# Patient Record
Sex: FEMALE | Race: WHITE | NOT HISPANIC OR LATINO | URBAN - METROPOLITAN AREA
[De-identification: names, ages, dates, MRNs, and addresses within clinical notes are randomized per-mention and may not be internally consistent; named-entity substitution may affect disease eponyms.]

---

## 2020-01-23 ENCOUNTER — OFFICE VISIT (OUTPATIENT)
Dept: PAIN MEDICINE | Facility: CLINIC | Age: 60
End: 2020-01-23
Payer: COMMERCIAL

## 2020-01-23 ENCOUNTER — TELEPHONE (OUTPATIENT)
Dept: PAIN MEDICINE | Facility: CLINIC | Age: 60
End: 2020-01-23

## 2020-01-23 ENCOUNTER — HOSPITAL ENCOUNTER (OUTPATIENT)
Dept: RADIOLOGY | Facility: HOSPITAL | Age: 60
Discharge: HOME | End: 2020-01-23
Attending: PHYSICAL MEDICINE & REHABILITATION
Payer: COMMERCIAL

## 2020-01-23 VITALS
DIASTOLIC BLOOD PRESSURE: 89 MMHG | HEIGHT: 62 IN | HEART RATE: 99 BPM | BODY MASS INDEX: 36.07 KG/M2 | WEIGHT: 196 LBS | SYSTOLIC BLOOD PRESSURE: 144 MMHG

## 2020-01-23 DIAGNOSIS — M25.562 CHRONIC PAIN OF BOTH KNEES: ICD-10-CM

## 2020-01-23 DIAGNOSIS — M25.561 CHRONIC PAIN OF BOTH KNEES: ICD-10-CM

## 2020-01-23 DIAGNOSIS — M17.0 PRIMARY OSTEOARTHRITIS OF BOTH KNEES: ICD-10-CM

## 2020-01-23 DIAGNOSIS — M79.18 CHRONIC MYOFASCIAL PAIN: ICD-10-CM

## 2020-01-23 DIAGNOSIS — M47.26 OSTEOARTHRITIS OF SPINE WITH RADICULOPATHY, LUMBAR REGION: ICD-10-CM

## 2020-01-23 DIAGNOSIS — G89.29 CHRONIC MYOFASCIAL PAIN: ICD-10-CM

## 2020-01-23 DIAGNOSIS — G89.29 CHRONIC PAIN OF BOTH KNEES: ICD-10-CM

## 2020-01-23 DIAGNOSIS — M46.1 SACROILIITIS: ICD-10-CM

## 2020-01-23 DIAGNOSIS — M46.1 SACROILIITIS: Primary | ICD-10-CM

## 2020-01-23 DIAGNOSIS — E66.9 OBESITY (BMI 35.0-39.9 WITHOUT COMORBIDITY): ICD-10-CM

## 2020-01-23 PROCEDURE — 3008F PR BODY MASS INDEX (BMI) DOCUMENTED: ICD-10-PCS | Mod: CPTII,S$GLB,, | Performed by: PHYSICAL MEDICINE & REHABILITATION

## 2020-01-23 PROCEDURE — 99204 PR OFFICE/OUTPT VISIT, NEW, LEVL IV, 45-59 MIN: ICD-10-PCS | Mod: S$GLB,,, | Performed by: PHYSICAL MEDICINE & REHABILITATION

## 2020-01-23 PROCEDURE — 73562 X-RAY EXAM OF KNEE 3: CPT | Mod: 26,RT,, | Performed by: RADIOLOGY

## 2020-01-23 PROCEDURE — 72110 X-RAY EXAM L-2 SPINE 4/>VWS: CPT | Mod: 26,,, | Performed by: RADIOLOGY

## 2020-01-23 PROCEDURE — 73562 X-RAY EXAM OF KNEE 3: CPT | Mod: 26,LT,, | Performed by: RADIOLOGY

## 2020-01-23 PROCEDURE — 73562 PR  X-RAY KNEE 3 VIEW: ICD-10-PCS | Mod: 26,LT,, | Performed by: RADIOLOGY

## 2020-01-23 PROCEDURE — 99999 PR PBB SHADOW E&M-EST. PATIENT-LVL III: ICD-10-PCS | Mod: PBBFAC,,, | Performed by: PHYSICAL MEDICINE & REHABILITATION

## 2020-01-23 PROCEDURE — 72110 X-RAY EXAM L-2 SPINE 4/>VWS: CPT | Mod: TC

## 2020-01-23 PROCEDURE — 99204 OFFICE O/P NEW MOD 45 MIN: CPT | Mod: S$GLB,,, | Performed by: PHYSICAL MEDICINE & REHABILITATION

## 2020-01-23 PROCEDURE — 72110 XR LUMBAR SPINE 5 VIEW WITH FLEX AND EXT: ICD-10-PCS | Mod: 26,,, | Performed by: RADIOLOGY

## 2020-01-23 PROCEDURE — 73562 X-RAY EXAM OF KNEE 3: CPT | Mod: TC,50

## 2020-01-23 PROCEDURE — 72052 XR CERVICAL SPINE 5 VIEW WITH FLEX AND EXT: ICD-10-PCS | Mod: 26,,, | Performed by: RADIOLOGY

## 2020-01-23 PROCEDURE — 72052 X-RAY EXAM NECK SPINE 6/>VWS: CPT | Mod: 26,,, | Performed by: RADIOLOGY

## 2020-01-23 PROCEDURE — 3008F BODY MASS INDEX DOCD: CPT | Mod: CPTII,S$GLB,, | Performed by: PHYSICAL MEDICINE & REHABILITATION

## 2020-01-23 PROCEDURE — 72052 X-RAY EXAM NECK SPINE 6/>VWS: CPT | Mod: TC

## 2020-01-23 PROCEDURE — 99999 PR PBB SHADOW E&M-EST. PATIENT-LVL III: CPT | Mod: PBBFAC,,, | Performed by: PHYSICAL MEDICINE & REHABILITATION

## 2020-01-23 RX ORDER — TIZANIDINE 4 MG/1
4 TABLET ORAL NIGHTLY PRN
Qty: 90 TABLET | Refills: 2 | Status: SHIPPED | OUTPATIENT
Start: 2020-01-23 | End: 2020-10-19

## 2020-01-23 RX ORDER — HYDROCODONE BITARTRATE AND ACETAMINOPHEN 7.5; 325 MG/1; MG/1
1 TABLET ORAL EVERY 8 HOURS PRN
Qty: 21 TABLET | Refills: 0 | Status: SHIPPED | OUTPATIENT
Start: 2020-01-23 | End: 2020-01-30

## 2020-01-23 RX ORDER — GABAPENTIN 300 MG/1
CAPSULE ORAL
Qty: 90 CAPSULE | Refills: 2 | Status: SHIPPED | OUTPATIENT
Start: 2020-01-23 | End: 2020-04-21

## 2020-01-23 NOTE — PATIENT INSTRUCTIONS
- Start tizanidine 4mg nightly as needed x 7 days, then increase by 1/2 tab every 7 days to most tolerable and effective dosing. Max dosing being 12mg (3 full tabs) nightly.  - Increase gabapentin to 300mg in the AM and 600mg in the PM  - Provided Norco 7.5mg/325mg 3x daily as needed for severe pain flare  - Continue other current medications as prescribed.  - Continue home based exercises and discussed the importance of a healthy and active lifestyle  - Return for follow up in 8 weeks.  Discussed viscosupplementation injections to be the provided by myself for outside orthopedics.    Please do not hesitate to contact the clinic [phone] if you have any questions regarding your treatment plan.     Dirk Alexis MD  Interventional Pain Medicine  [facility] [place]

## 2020-01-23 NOTE — TELEPHONE ENCOUNTER
Pt will be late and I informed her that  has meeting 12 until 1 pm and she may have to wait until after 1 pm to be seen. Pt was ok with that

## 2020-01-23 NOTE — TELEPHONE ENCOUNTER
----- Message from Kristi Ayon sent at 1/23/2020 11:11 AM CST -----  Contact: pt   The patient is calling in regards to being 15-20 minutes late due to being lost and heading back over the [facility],please advise # [phone] (home)
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
## 2020-02-12 ENCOUNTER — PATIENT MESSAGE (OUTPATIENT)
Dept: PAIN MEDICINE | Facility: CLINIC | Age: 60
End: 2020-02-12

## 2020-02-12 ENCOUNTER — TELEPHONE (OUTPATIENT)
Dept: PAIN MEDICINE | Facility: CLINIC | Age: 60
End: 2020-02-12

## 2020-02-12 NOTE — TELEPHONE ENCOUNTER
Tried to call to reschedule appt for 03/20 with  to a day where he will be at the [facility] or see him in [place] on 03/20 . No answer. Left vm and left message on portal

## 2020-02-17 ENCOUNTER — TELEPHONE (OUTPATIENT)
Dept: PAIN MEDICINE | Facility: CLINIC | Age: 60
End: 2020-02-17

## 2020-02-17 NOTE — TELEPHONE ENCOUNTER
Pt asked to get copy of disc for x ray. Informed pt she has to go to medical records and request for disc . Informed pt that I can give her copy of report and get it from  at the [facility]. Pt understood. All questions answered.

## 2020-02-17 NOTE — TELEPHONE ENCOUNTER
----- Message from Angela Harris sent at 2/17/2020  9:53 AM CST -----  Contact: Pt  Pt is requesting call back in regards to getting copy of XRAY         Pls call back at [phone]

## 2020-03-18 ENCOUNTER — TELEPHONE (OUTPATIENT)
Dept: PAIN MEDICINE | Facility: CLINIC | Age: 60
End: 2020-03-18

## 2020-04-21 RX ORDER — GABAPENTIN 300 MG/1
CAPSULE ORAL
Qty: 90 CAPSULE | Refills: 2 | Status: SHIPPED | OUTPATIENT
Start: 2020-04-21 | End: 2020-07-14

## 2021-03-12 PROBLEM — F31.9 BIPOLAR DISORDER: Status: ACTIVE | Noted: 2018-12-12

## 2021-03-12 PROBLEM — N18.30 STAGE 3 CHRONIC KIDNEY DISEASE: Status: ACTIVE | Noted: 2018-12-12

## 2021-03-12 PROBLEM — N28.1 RENAL CYST: Status: ACTIVE | Noted: 2019-03-05

## 2021-06-24 PROBLEM — E78.2 MIXED HYPERLIPIDEMIA: Status: ACTIVE | Noted: 2021-05-07

## 2021-06-24 PROBLEM — E03.9 HYPOTHYROIDISM (ACQUIRED): Status: ACTIVE | Noted: 2021-05-07

## 2021-06-24 PROBLEM — K21.9 GERD (GASTROESOPHAGEAL REFLUX DISEASE): Status: ACTIVE | Noted: 2021-05-07

## 2021-06-24 PROBLEM — J45.909 MILD ASTHMA WITHOUT COMPLICATION: Status: ACTIVE | Noted: 2021-05-07

## 2021-06-28 ENCOUNTER — TELEPHONE (OUTPATIENT)
Dept: RHEUMATOLOGY | Facility: CLINIC | Age: 61
End: 2021-06-28

## 2021-12-10 PROBLEM — N18.30 STAGE 3 CHRONIC KIDNEY DISEASE: Status: RESOLVED | Noted: 2018-12-12 | Resolved: 2021-12-10

## 2023-04-06 ENCOUNTER — PATIENT MESSAGE (OUTPATIENT)
Dept: RESEARCH | Facility: HOSPITAL | Age: 63
End: 2023-04-06
Payer: COMMERCIAL

## 2023-05-31 ENCOUNTER — PATIENT MESSAGE (OUTPATIENT)
Dept: RESEARCH | Facility: HOSPITAL | Age: 63
End: 2023-05-31
Payer: COMMERCIAL

## 2023-06-14 PROBLEM — M06.09 RHEUMATOID ARTHRITIS OF MULTIPLE SITES WITH NEGATIVE RHEUMATOID FACTOR: Status: ACTIVE | Noted: 2023-06-14

## 2024-06-20 ENCOUNTER — OFFICE VISIT (OUTPATIENT)
Dept: RHEUMATOLOGY | Facility: CLINIC | Age: 64
End: 2024-06-20
Payer: COMMERCIAL

## 2024-06-20 ENCOUNTER — PATIENT MESSAGE (OUTPATIENT)
Dept: RHEUMATOLOGY | Facility: CLINIC | Age: 64
End: 2024-06-20

## 2024-06-20 ENCOUNTER — HOSPITAL ENCOUNTER (OUTPATIENT)
Dept: RADIOLOGY | Facility: HOSPITAL | Age: 64
Discharge: HOME | End: 2024-06-20
Attending: INTERNAL MEDICINE
Payer: COMMERCIAL

## 2024-06-20 VITALS
SYSTOLIC BLOOD PRESSURE: 131 MMHG | HEIGHT: 62 IN | HEART RATE: 94 BPM | WEIGHT: 151.25 LBS | DIASTOLIC BLOOD PRESSURE: 89 MMHG | BODY MASS INDEX: 27.83 KG/M2

## 2024-06-20 DIAGNOSIS — M25.40 PAINFUL SWELLING OF JOINT: Primary | ICD-10-CM

## 2024-06-20 DIAGNOSIS — F31.9 BIPOLAR AFFECTIVE DISORDER, REMISSION STATUS UNSPECIFIED: ICD-10-CM

## 2024-06-20 DIAGNOSIS — M54.50 CHRONIC LOW BACK PAIN, UNSPECIFIED BACK PAIN LATERALITY, UNSPECIFIED WHETHER SCIATICA PRESENT: ICD-10-CM

## 2024-06-20 DIAGNOSIS — G89.29 CHRONIC LOW BACK PAIN, UNSPECIFIED BACK PAIN LATERALITY, UNSPECIFIED WHETHER SCIATICA PRESENT: ICD-10-CM

## 2024-06-20 DIAGNOSIS — E55.9 VITAMIN D INSUFFICIENCY: ICD-10-CM

## 2024-06-20 DIAGNOSIS — M79.642 PAIN IN BOTH HANDS: ICD-10-CM

## 2024-06-20 DIAGNOSIS — M35.00 SICCA SYNDROME: ICD-10-CM

## 2024-06-20 DIAGNOSIS — R53.82 CHRONIC FATIGUE: ICD-10-CM

## 2024-06-20 DIAGNOSIS — M79.641 PAIN IN BOTH HANDS: ICD-10-CM

## 2024-06-20 DIAGNOSIS — M15.9 PRIMARY OSTEOARTHRITIS INVOLVING MULTIPLE JOINTS: ICD-10-CM

## 2024-06-20 DIAGNOSIS — Z98.890 S/P SPINAL SURGERY: ICD-10-CM

## 2024-06-20 PROBLEM — M06.09 RHEUMATOID ARTHRITIS OF MULTIPLE SITES WITH NEGATIVE RHEUMATOID FACTOR: Status: RESOLVED | Noted: 2023-06-14 | Resolved: 2024-06-20

## 2024-06-20 PROCEDURE — 73130 X-RAY EXAM OF HAND: CPT | Mod: 26,50,, | Performed by: RADIOLOGY

## 2024-06-20 PROCEDURE — 99999 PR PBB SHADOW E&M-EST. PATIENT-LVL V: CPT | Mod: PBBFAC,,, | Performed by: INTERNAL MEDICINE

## 2024-06-20 PROCEDURE — 73630 X-RAY EXAM OF FOOT: CPT | Mod: 26,50,, | Performed by: RADIOLOGY

## 2024-06-20 PROCEDURE — 73130 X-RAY EXAM OF HAND: CPT | Mod: TC,50

## 2024-06-20 PROCEDURE — 73630 X-RAY EXAM OF FOOT: CPT | Mod: TC,50

## 2024-06-20 RX ORDER — HYDROXYCHLOROQUINE SULFATE 200 MG/1
200 TABLET, FILM COATED ORAL 2 TIMES DAILY
Qty: 180 TABLET | Refills: 1 | Status: SHIPPED | OUTPATIENT
Start: 2024-06-20 | End: 2024-12-17

## 2024-06-20 NOTE — PROGRESS NOTES
RHEUMATOLOGY OUTPATIENT CLINIC NOTE    6/20/2024    Attending Rheumatologist: Selvin Kamara  Primary Care Provider/Physician Requesting Consultation: Jose Taylor FNP   Chief Complaint/Reason For Consultation:  Rheumatoid Arthritis      Subjective:     Kelly Gil[facility] is a 63 y.o. White female with chronic hand pain    Recurrent hand pain with mixed pain characteristics.  Describes also chronic LBP with mechanical pattern and no alarm s/s.    Review of Systems   Constitutional:  Negative for fever.   HENT:  Negative for nosebleeds.         Mild sicca sx.  Denies jaw claudication   Eyes:  Negative for photophobia and pain (no hx of uveitis or scleritis).   Cardiovascular:  Negative for chest pain.   Gastrointestinal:  Negative for blood in stool and melena.        No hx of IBD.  Hx of diverticulitis.   Genitourinary:  Negative for dysuria, hematuria and urgency.   Musculoskeletal:  Positive for back pain and joint pain.   Skin:  Negative for rash.        No hx of PsO.  Denies RP   Neurological:  Negative for focal weakness, weakness and headaches.   Endo/Heme/Allergies:         No hx of diverticulitis       Chronic comorbid conditions affecting medical decision making today:  Past Medical History:   Diagnosis Date    Allergy     Anxiety     Asthma     Bipolar disorder     Chronic low back pain     Depression     Deviated septum     Disorder of kidney and ureter     Diverticulitis     DJD (degenerative joint disease)     Encounter for blood transfusion     GERD (gastroesophageal reflux disease)     Hyperlipidemia     Hypothyroidism     Neck pain     Osteoarthritis      Past Surgical History:   Procedure Laterality Date    ABCESS DRAINAGE      CATARACT EXTRACTION      DILATION AND CURETTAGE OF UTERUS      EXTRACTION OF TOOTH      EYE SURGERY  01/2006    Cataract Surgery Both Eyes Not Done On Same Day    HIP SURGERY      HYSTERECTOMY      JOINT REPLACEMENT  12/22/2015 & 11/03/2016    Right Hip first then Left Hip     LUMBAR DISC SURGERY      NASAL SEPTOPLASTY      OPEN RESECTION OF LUNG      SINUS SURGERY      SPINE SURGERY  2012    Lower Back Surgery    turbinectomy       Family History   Problem Relation Name Age of Onset    Charcot-Bonny-Tooth disease Mother Olga Carter     Alzheimer's disease Mother Olga Carter     Arthritis Mother Olga Carter         Charcot-Bonny-Tooth,    Lung cancer Father Murali Carter     Pancreatic cancer Father Murali Carter     Liver cancer Father Murali Carter     Bone cancer Father Murali Carter     Cancer Father Murali Carter         Pancreatic Cancer, Liver Cancer, Bone Cancer, Brain Cancer, Passed Away    Cancer Sister Pushpa Chisholm         Endometrial Cancer, Lung Cancer, Passed away    Endometrial cancer Sister Pushpa Chisholm     Lung cancer Sister Pushpa Chisholm     Diabetes Sister Pushpa Chisholm     Heart disease Brother Ace Carter         Heart Disease, Organ Failure ... Has passed away     Social History     Tobacco Use   Smoking Status Former    Current packs/day: 0.00    Average packs/day: 0.3 packs/day for 1 year (0.3 ttl pk-yrs)    Types: Cigars, Cigarettes    Quit date: 2024    Years since quittin.1   Smokeless Tobacco Never   Tobacco Comments    Smoke 2 or 3 Black & Mild Cigars as day.       Current Outpatient Medications:     albuterol (PROVENTIL/VENTOLIN HFA) 90 mcg/actuation inhaler, Inhale 2 puffs into the lungs every 6 (six) hours as needed for Wheezing. Rescue, Disp: 8.5 g, Rfl: 2    albuterol-ipratropium (DUO-NEB) 2.5 mg-0.5 mg/3 mL nebulizer solution, Take 3 mLs by nebulization every 6 (six) hours as needed for Wheezing. Rescue, Disp: 30 each, Rfl: 0    ALPRAZolam (XANAX) 0.5 MG tablet, Take 1 tablet (0.5 mg total) by mouth 2 (two) times daily., Disp: 60 tablet, Rfl: 2    azelastine (ASTELIN) 137 mcg (0.1 %) nasal spray,  SPRAY 2 SPRAYS INTO EACH NOSTRIL TWICE DAILY., Disp: 30 mL, Rfl: 1    butalbital-acetaminophen-caffeine -40 mg (FIORICET, ESGIC) -40 mg per tablet, Take 1 tablet by mouth every 6 (six) hours as needed for Headaches., Disp: 30 tablet, Rfl: 2    cariprazine (VRAYLAR) 3 mg Cap, Take 1 capsule (3 mg total) by mouth Daily., Disp: 90 capsule, Rfl: 1    DULoxetine (CYMBALTA) 60 MG capsule, Take 1 capsule (60 mg total) by mouth once daily., Disp: 90 capsule, Rfl: 1    levothyroxine (SYNTHROID) 50 MCG tablet, Take 1 tablet (50 mcg total) by mouth once daily., Disp: 90 tablet, Rfl: 1    meloxicam (MOBIC) 15 MG tablet, Take 1 tablet (15 mg total) by mouth daily as needed for Pain., Disp: 90 tablet, Rfl: 1    montelukast (SINGULAIR) 10 mg tablet, Take 1 tablet (10 mg total) by mouth every evening., Disp: 90 tablet, Rfl: 1    omeprazole (PRILOSEC) 40 MG capsule, Take 1 capsule (40 mg total) by mouth once daily., Disp: 90 capsule, Rfl: 1    pravastatin (PRAVACHOL) 20 MG tablet, Take 1 tablet (20 mg total) by mouth once daily., Disp: 90 tablet, Rfl: 1    QUEtiapine (SEROQUEL XR) 400 MG Tb24, Take 1 tablet (400 mg total) by mouth once daily., Disp: 90 tablet, Rfl: 1    tiZANidine (ZANAFLEX) 4 MG tablet, Take 1-2 tablets (4-8 mg total) by mouth nightly as needed (back pain)., Disp: 30 tablet, Rfl: 2    cetirizine-pseudoephedrine (ZYRTEC-D) 5-120 mg Tb12, Take 1 tablet by mouth 2 (two) times a day. (Patient not taking: Reported on 6/20/2024), Disp: 30 tablet, Rfl: 1    hydrOXYzine HCL (ATARAX) 25 MG tablet, Take 1 tablet (25 mg total) by mouth 3 (three) times daily. (Patient not taking: Reported on 6/20/2024), Disp: 60 tablet, Rfl: 2     Objective:     Vitals:    06/20/24 0742   BP: 131/89   Pulse: 94     Physical Exam   Eyes: Conjunctivae are normal.   Pulmonary/Chest: Effort normal. No respiratory distress.   Musculoskeletal:         General: Swelling, tenderness and deformity present.   Neurological: She displays no  weakness.   Skin: No rash noted.       Reviewed available old and all outside pertinent medical records available.    All lab results personally reviewed and interpreted by me.       ASSESSMENT / PLAN     1. Painful swelling of joint  Rt thumb MP joint.  Features of early erosive OA.  Patient amenable for trial of HCQ.  Plan for MRI of worst joint to determine if active synovitis, if unrevealing tests or no response to HCQ.  C-Reactive Protein    Sedimentation rate    hydroxychloroquine (PLAQUENIL) 200 mg tablet      2. Pain in both hands  Negative RF on file.  Ambulatory referral/consult to Rheumatology    Cyclic Citrullinated Peptide Antibody, IgG    Rheumatoid Factor      3. Primary osteoarthritis involving multiple joints  Cyclic Citrullinated Peptide Antibody, IgG    Rheumatoid Factor    X-Ray Hand Complete Bilateral    X-Ray Foot AP Bilateral      4. Bipolar affective disorder, remission status unspecified        5. S/P spinal surgery  Follow with IPM if failure to PT      6. Chronic low back pain, unspecified back pain laterality, unspecified whether sciatica present  No ankylosis or marginal erosive changes reported on imaging.  HLA B27 Antigen      7. Chronic fatigue  CBC Auto Differential    Hepatitis B Surface Antigen    Hepatitis B Core Antibody, Total    Quantiferon Gold TB      8. Sicca syndrome  SPENCER Screen w/Reflex    CBC Auto Differential    Comprehensive Metabolic Panel    Ambulatory referral/consult to Ophthalmology      9. Vitamin D insufficiency  Vitamin D level                Selvin Kamara M.D.

## 2024-07-26 ENCOUNTER — OFFICE VISIT (OUTPATIENT)
Dept: OPHTHALMOLOGY | Facility: CLINIC | Age: 64
End: 2024-07-26
Payer: COMMERCIAL

## 2024-07-26 DIAGNOSIS — Z79.899 ENCOUNTER FOR EYE EXAM DUE TO HIGH RISK MEDICATION: Primary | ICD-10-CM

## 2024-07-26 PROCEDURE — 99999 PR PBB SHADOW E&M-EST. PATIENT-LVL III: CPT | Mod: PBBFAC,,, | Performed by: OPHTHALMOLOGY

## 2024-07-26 NOTE — PROGRESS NOTES
HPI     High-risk medication     Additional comments: Pt here for plaquenil baseline exam. No pain or   discomfort. Pt says she has been seeing floaters and flashes in both eyes   for about 2 months.            Comments    1. Osteoarthritis, plaquenil x 6/2024  2. Fhx AMD (sister)             Last edited by Sohail Howard MA on 7/26/2024 10:31 AM.            Assessment /Plan     For exam results, see Encounter Report.    Encounter for eye exam due to high risk medication  400mg a day  hydroxychloroquine (PLAQUENIL)  No evidence of maculopathy on DFE. HVF 10-2 and MOCT both WNL  Follow with annual testing       RTC 1 year sooner if needed

## 2024-08-14 DIAGNOSIS — M25.40 PAINFUL SWELLING OF JOINT: ICD-10-CM

## 2024-08-15 RX ORDER — HYDROXYCHLOROQUINE SULFATE 200 MG/1
200 TABLET, FILM COATED ORAL 2 TIMES DAILY
Qty: 180 TABLET | Refills: 1 | Status: SHIPPED | OUTPATIENT
Start: 2024-08-15

## 2024-09-21 NOTE — TELEPHONE ENCOUNTER
Was calling to confirm appt for 03/19/20 with  and pt asked would he prescribing pain medication? Informed pt that we are interventional pain and we focus on interventional measures and not pain medication . Pt requested I cancel and she stated she would find care elsewhere. All questions answered.    1% lidocaine 1% lidocaine 1% lidocaine

## 2024-09-25 ENCOUNTER — OFFICE VISIT (OUTPATIENT)
Dept: RHEUMATOLOGY | Facility: CLINIC | Age: 64
End: 2024-09-25
Payer: COMMERCIAL

## 2024-09-25 VITALS
DIASTOLIC BLOOD PRESSURE: 85 MMHG | BODY MASS INDEX: 30.18 KG/M2 | HEIGHT: 62 IN | SYSTOLIC BLOOD PRESSURE: 145 MMHG | WEIGHT: 164 LBS | HEART RATE: 85 BPM

## 2024-09-25 DIAGNOSIS — M25.40 PAINFUL SWELLING OF JOINT: ICD-10-CM

## 2024-09-25 DIAGNOSIS — M54.50 CHRONIC LOW BACK PAIN, UNSPECIFIED BACK PAIN LATERALITY, UNSPECIFIED WHETHER SCIATICA PRESENT: ICD-10-CM

## 2024-09-25 DIAGNOSIS — Z51.81 MEDICATION MONITORING ENCOUNTER: ICD-10-CM

## 2024-09-25 DIAGNOSIS — M15.4 EROSIVE OSTEOARTHRITIS: Primary | ICD-10-CM

## 2024-09-25 DIAGNOSIS — M17.0 PRIMARY OSTEOARTHRITIS OF BOTH KNEES: ICD-10-CM

## 2024-09-25 DIAGNOSIS — G89.29 CHRONIC LOW BACK PAIN, UNSPECIFIED BACK PAIN LATERALITY, UNSPECIFIED WHETHER SCIATICA PRESENT: ICD-10-CM

## 2024-09-25 PROCEDURE — 20610 DRAIN/INJ JOINT/BURSA W/O US: CPT | Mod: 50,S$GLB,, | Performed by: INTERNAL MEDICINE

## 2024-09-25 PROCEDURE — 99999 PR PBB SHADOW E&M-EST. PATIENT-LVL III: CPT | Mod: PBBFAC,,, | Performed by: INTERNAL MEDICINE

## 2024-09-25 PROCEDURE — 3008F BODY MASS INDEX DOCD: CPT | Mod: CPTII,S$GLB,, | Performed by: INTERNAL MEDICINE

## 2024-09-25 PROCEDURE — 99214 OFFICE O/P EST MOD 30 MIN: CPT | Mod: 25,S$GLB,, | Performed by: INTERNAL MEDICINE

## 2024-09-25 PROCEDURE — 3079F DIAST BP 80-89 MM HG: CPT | Mod: CPTII,S$GLB,, | Performed by: INTERNAL MEDICINE

## 2024-09-25 PROCEDURE — 1159F MED LIST DOCD IN RCRD: CPT | Mod: CPTII,S$GLB,, | Performed by: INTERNAL MEDICINE

## 2024-09-25 PROCEDURE — 3077F SYST BP >= 140 MM HG: CPT | Mod: CPTII,S$GLB,, | Performed by: INTERNAL MEDICINE

## 2024-09-25 PROCEDURE — 3044F HG A1C LEVEL LT 7.0%: CPT | Mod: CPTII,S$GLB,, | Performed by: INTERNAL MEDICINE

## 2024-09-25 RX ORDER — HYDROXYCHLOROQUINE SULFATE 200 MG/1
200 TABLET, FILM COATED ORAL 2 TIMES DAILY
Qty: 180 TABLET | Refills: 1 | Status: SHIPPED | OUTPATIENT
Start: 2024-09-25

## 2024-09-25 RX ORDER — TRIAMCINOLONE ACETONIDE 40 MG/ML
40 INJECTION, SUSPENSION INTRA-ARTICULAR; INTRAMUSCULAR
Status: DISCONTINUED | OUTPATIENT
Start: 2024-09-25 | End: 2024-09-25 | Stop reason: HOSPADM

## 2024-09-25 RX ADMIN — TRIAMCINOLONE ACETONIDE 40 MG: 40 INJECTION, SUSPENSION INTRA-ARTICULAR; INTRAMUSCULAR at 11:09

## 2024-09-25 NOTE — PROGRESS NOTES
RHEUMATOLOGY OUTPATIENT CLINIC NOTE    9/25/2024    Attending Rheumatologist: Selvin Kamara  Primary Care Provider/Physician Requesting Consultation: Jose Taylor FNP   Chief Complaint/Reason For Consultation:  No chief complaint on file.      Subjective:     Kelly Gilmore is a 64 y.o. White female with erosive OA    Adequate response to HCQ, reports less hand arthralgias.  Unchanged chronic LE and back pain discomfort, sub-adequate response to aquatic therapy.    Review of Systems   Constitutional:  Negative for fever.   HENT:  Negative for nosebleeds.    Eyes:  Negative for photophobia and pain.   Respiratory:  Negative for cough.    Cardiovascular:  Negative for chest pain and leg swelling.   Gastrointestinal:  Negative for blood in stool and melena.   Genitourinary:  Negative for dysuria, hematuria and urgency.   Musculoskeletal:  Positive for back pain and joint pain. Negative for falls.   Skin:  Negative for rash.   Neurological:  Negative for weakness.     Chronic comorbid conditions affecting medical decision making today:  Past Medical History:   Diagnosis Date    Allergy     Anxiety     Asthma     Bipolar disorder     Chronic low back pain     Depression     Deviated septum     Disorder of kidney and ureter     Diverticulitis     DJD (degenerative joint disease)     Encounter for blood transfusion     GERD (gastroesophageal reflux disease)     Hyperlipidemia     Hypothyroidism     Neck pain     Osteoarthritis      Past Surgical History:   Procedure Laterality Date    ABCESS DRAINAGE      CATARACT EXTRACTION      DILATION AND CURETTAGE OF UTERUS      EXTRACTION OF TOOTH      EYE SURGERY  01/2006    Cataract Surgery Both Eyes Not Done On Same Day    HIP SURGERY      HYSTERECTOMY      JOINT REPLACEMENT  12/22/2015 & 11/03/2016    Right Hip first then Left Hip    LUMBAR DISC SURGERY      NASAL SEPTOPLASTY      OPEN RESECTION OF LUNG      SINUS SURGERY      SPINE SURGERY  08/2012    Lower Back Surgery     turbinectomy       Family History   Problem Relation Name Age of Onset    Charcot-Bonny-Tooth disease Mother Olga Carter     Alzheimer's disease Mother Olga Carter     Arthritis Mother Olga Carter         Charcot-Bonny-Tooth,    Lung cancer Father Murali Carter     Pancreatic cancer Father Murali Carter     Liver cancer Father Murali Carter     Bone cancer Father Murali Carter     Cancer Father Murali Cartre         Pancreatic Cancer, Liver Cancer, Bone Cancer, Brain Cancer, Passed Away    Cancer Sister Pushpa Chisholm         Endometrial Cancer, Lung Cancer, Passed away    Endometrial cancer Sister Pushpa Chisholm     Lung cancer Sister Pushpa Chisholm     Diabetes Sister Pushpa Chisholm     Heart disease Brother Ace Carter         Heart Disease, Organ Failure ... Has passed away     Social History     Tobacco Use   Smoking Status Former    Current packs/day: 0.00    Average packs/day: 0.3 packs/day for 1 year (0.3 ttl pk-yrs)    Types: Cigars, Cigarettes    Quit date: 2024    Years since quittin.4   Smokeless Tobacco Never   Tobacco Comments    Smoke 2 or 3 Black & Mild Cigars as day.       Current Outpatient Medications:     albuterol (PROVENTIL/VENTOLIN HFA) 90 mcg/actuation inhaler, Inhale 2 puffs into the lungs every 6 (six) hours as needed for Wheezing. Rescue, Disp: 8.5 g, Rfl: 2    albuterol-ipratropium (DUO-NEB) 2.5 mg-0.5 mg/3 mL nebulizer solution, Take 3 mLs by nebulization every 6 (six) hours as needed for Wheezing. Rescue, Disp: 30 each, Rfl: 0    ALPRAZolam (XANAX) 0.5 MG tablet, Take 1 tablet (0.5 mg total) by mouth 2 (two) times daily., Disp: 60 tablet, Rfl: 2    azelastine (ASTELIN) 137 mcg (0.1 %) nasal spray, SPRAY 2 SPRAYS INTO EACH NOSTRIL TWICE DAILY., Disp: 30 mL, Rfl: 1    butalbital-acetaminophen-caffeine -40 mg (FIORICET, ESGIC)  -40 mg per tablet, Take 1 tablet by mouth every 6 (six) hours as needed for Headaches., Disp: 30 tablet, Rfl: 2    cariprazine (VRAYLAR) 3 mg Cap, Take 1 capsule (3 mg total) by mouth Daily., Disp: 90 capsule, Rfl: 1    cetirizine-pseudoephedrine (ZYRTEC-D) 5-120 mg Tb12, Take 1 tablet by mouth 2 (two) times a day., Disp: 30 tablet, Rfl: 1    DULoxetine (CYMBALTA) 60 MG capsule, TAKE 1 CAPSULE BY MOUTH EVERY DAY, Disp: 90 capsule, Rfl: 1    hydroxychloroquine (PLAQUENIL) 200 mg tablet, TAKE 1 TABLET BY MOUTH TWICE A DAY, Disp: 180 tablet, Rfl: 1    hydrOXYzine HCL (ATARAX) 25 MG tablet, Take 1 tablet (25 mg total) by mouth 3 (three) times daily., Disp: 60 tablet, Rfl: 2    levothyroxine (SYNTHROID) 50 MCG tablet, Take 1 tablet (50 mcg total) by mouth once daily., Disp: 90 tablet, Rfl: 1    meloxicam (MOBIC) 15 MG tablet, TAKE 1 TABLET BY MOUTH EVERY DAY AS NEEDED FOR PAIN, Disp: 90 tablet, Rfl: 1    montelukast (SINGULAIR) 10 mg tablet, TAKE 1 TABLET BY MOUTH EVERY EVENING, Disp: 90 tablet, Rfl: 1    omeprazole (PRILOSEC) 40 MG capsule, Take 1 capsule (40 mg total) by mouth once daily., Disp: 90 capsule, Rfl: 1    pravastatin (PRAVACHOL) 20 MG tablet, Take 1 tablet (20 mg total) by mouth once daily., Disp: 90 tablet, Rfl: 1    QUEtiapine (SEROQUEL XR) 400 MG Tb24, TAKE 1 TABLET BY MOUTH EVERY DAY, Disp: 90 tablet, Rfl: 1    tiZANidine (ZANAFLEX) 4 MG tablet, TAKE 1 OR 2 TABLETS BY MOUTH AT BEDTIME AS NEEDED FOR BACK PAIN, Disp: 60 tablet, Rfl: 0     Objective:     Vitals:    09/25/24 1040   BP: (!) 145/85   Pulse: 85     Physical Exam   Eyes: Conjunctivae are normal.   Pulmonary/Chest: Effort normal. No respiratory distress.   Musculoskeletal:         General: Deformity present. No swelling or tenderness. Normal range of motion.   Skin: No rash noted.       Reviewed available old and all outside pertinent medical records available.    All lab results personally reviewed and interpreted by me.       ASSESSMENT  / PLAN     1. Erosive osteoarthritis  Negative RA serologies.  APR WNR.  No chronic inflammatory arthropathy reported on XR.  Adequate response to HCQ, continue unchanged.  C-Reactive Protein    Sedimentation rate    hydroxychloroquine (PLAQUENIL) 200 mg tablet      2. Medication monitoring encounter  At least once per year eye clinic check ups while on Plaquenil  Comprehensive Metabolic Panel      3. Painful swelling of joint  Uric Acid      4. Primary osteoarthritis of both knees  Orthopedics.  Large Joint Aspiration/Injection: R knee    Large Joint Aspiration/Injection: L knee      5. Chronic low back pain, unspecified back pain laterality, unspecified whether sciatica present  PT and IPM PRN              Large Joint Aspiration/Injection: R knee    Date/Time: 9/25/2024 11:00 AM    Performed by: Selvin Kamara MD  Authorized by: Selvin Kamara MD    Consent Done?:  Yes (Verbal)  Indications:  Pain  Site marked: the procedure site was marked    Timeout: prior to procedure the correct patient, procedure, and site was verified    Prep: patient was prepped and draped in usual sterile fashion    Local anesthetic:  Lidocaine 2% without epinephrine    Details:  Needle Size:  25 G  Ultrasonic Guidance for needle placement?: No    Location:  Knee  Site:  R knee  Medications:  40 mg triamcinolone acetonide 40 mg/mL  Patient tolerance:  Patient tolerated the procedure well with no immediate complications      Large Joint Aspiration/Injection: L knee    Date/Time: 9/25/2024 11:00 AM    Performed by: Selvin Kamara MD  Authorized by: Selvin Kamara MD    Consent Done?:  Yes (Verbal)  Indications:  Pain  Site marked: the procedure site was marked    Timeout: prior to procedure the correct patient, procedure, and site was verified    Prep: patient was prepped and draped in usual sterile fashion    Local anesthetic:  Lidocaine 2% without epinephrine    Details:  Needle Size:  25 G  Ultrasonic Guidance for needle placement?:  No    Approach:  Anterior  Location:  Knee  Site:  L knee  Medications:  40 mg triamcinolone acetonide 40 mg/mL  Patient tolerance:  Patient tolerated the procedure well with no immediate complications      Selvin Kamara M.D.

## 2024-09-25 NOTE — PROCEDURES
Large Joint Aspiration/Injection: L knee    Date/Time: 9/25/2024 11:00 AM    Performed by: Selvin Kamara MD  Authorized by: Selvin Kamara MD    Consent Done?:  Yes (Verbal)  Indications:  Pain  Site marked: the procedure site was marked    Timeout: prior to procedure the correct patient, procedure, and site was verified    Prep: patient was prepped and draped in usual sterile fashion    Local anesthetic:  Lidocaine 2% without epinephrine    Details:  Needle Size:  25 G  Ultrasonic Guidance for needle placement?: No    Approach:  Anterior  Location:  Knee  Site:  L knee  Medications:  40 mg triamcinolone acetonide 40 mg/mL  Patient tolerance:  Patient tolerated the procedure well with no immediate complications

## 2024-09-25 NOTE — PROCEDURES
Large Joint Aspiration/Injection: R knee    Date/Time: 9/25/2024 11:00 AM    Performed by: Selvin Kamara MD  Authorized by: Selvin Kamara MD    Consent Done?:  Yes (Verbal)  Indications:  Pain  Site marked: the procedure site was marked    Timeout: prior to procedure the correct patient, procedure, and site was verified    Prep: patient was prepped and draped in usual sterile fashion    Local anesthetic:  Lidocaine 2% without epinephrine    Details:  Needle Size:  25 G  Ultrasonic Guidance for needle placement?: No    Location:  Knee  Site:  R knee  Medications:  40 mg triamcinolone acetonide 40 mg/mL  Patient tolerance:  Patient tolerated the procedure well with no immediate complications

## 2024-10-23 ENCOUNTER — TELEPHONE (OUTPATIENT)
Dept: NEUROSURGERY | Facility: CLINIC | Age: 64
End: 2024-10-23
Payer: COMMERCIAL

## 2024-11-19 ENCOUNTER — OFFICE VISIT (OUTPATIENT)
Dept: NEUROSURGERY | Facility: CLINIC | Age: 64
End: 2024-11-19
Payer: COMMERCIAL

## 2024-11-19 VITALS
DIASTOLIC BLOOD PRESSURE: 89 MMHG | BODY MASS INDEX: 28.85 KG/M2 | WEIGHT: 157.75 LBS | SYSTOLIC BLOOD PRESSURE: 153 MMHG | HEART RATE: 76 BPM

## 2024-11-19 DIAGNOSIS — M54.41 CHRONIC BILATERAL LOW BACK PAIN WITH BILATERAL SCIATICA: ICD-10-CM

## 2024-11-19 DIAGNOSIS — M54.42 CHRONIC BILATERAL LOW BACK PAIN WITH BILATERAL SCIATICA: ICD-10-CM

## 2024-11-19 DIAGNOSIS — G89.29 CHRONIC BILATERAL LOW BACK PAIN WITH BILATERAL SCIATICA: ICD-10-CM

## 2024-11-19 DIAGNOSIS — Z98.1 HISTORY OF LUMBAR FUSION: Primary | ICD-10-CM

## 2024-11-19 DIAGNOSIS — M54.9 DORSALGIA, UNSPECIFIED: ICD-10-CM

## 2024-11-19 PROCEDURE — 3079F DIAST BP 80-89 MM HG: CPT | Mod: CPTII,S$GLB,, | Performed by: PHYSICIAN ASSISTANT

## 2024-11-19 PROCEDURE — 99999 PR PBB SHADOW E&M-EST. PATIENT-LVL IV: CPT | Mod: PBBFAC,,, | Performed by: PHYSICIAN ASSISTANT

## 2024-11-19 PROCEDURE — 1159F MED LIST DOCD IN RCRD: CPT | Mod: CPTII,S$GLB,, | Performed by: PHYSICIAN ASSISTANT

## 2024-11-19 PROCEDURE — 3044F HG A1C LEVEL LT 7.0%: CPT | Mod: CPTII,S$GLB,, | Performed by: PHYSICIAN ASSISTANT

## 2024-11-19 PROCEDURE — 3008F BODY MASS INDEX DOCD: CPT | Mod: CPTII,S$GLB,, | Performed by: PHYSICIAN ASSISTANT

## 2024-11-19 PROCEDURE — 99203 OFFICE O/P NEW LOW 30 MIN: CPT | Mod: S$GLB,,, | Performed by: PHYSICIAN ASSISTANT

## 2024-11-19 PROCEDURE — 3077F SYST BP >= 140 MM HG: CPT | Mod: CPTII,S$GLB,, | Performed by: PHYSICIAN ASSISTANT

## 2024-11-19 RX ORDER — TRAMADOL HYDROCHLORIDE 50 MG/1
50 TABLET ORAL EVERY 4 HOURS PRN
Qty: 28 TABLET | Refills: 0 | Status: SHIPPED | OUTPATIENT
Start: 2024-11-19

## 2024-11-19 RX ORDER — TIZANIDINE 4 MG/1
4 TABLET ORAL EVERY 6 HOURS PRN
Qty: 30 TABLET | Refills: 2 | Status: SHIPPED | OUTPATIENT
Start: 2024-11-19 | End: 2024-12-19

## 2024-11-19 RX ORDER — PREGABALIN 50 MG/1
50 CAPSULE ORAL 3 TIMES DAILY
Qty: 90 CAPSULE | Refills: 6 | Status: SHIPPED | OUTPATIENT
Start: 2024-11-19 | End: 2025-05-20

## 2024-11-19 RX ORDER — METHYLPREDNISOLONE 4 MG/1
TABLET ORAL
Qty: 21 EACH | Refills: 0 | Status: SHIPPED | OUTPATIENT
Start: 2024-11-19 | End: 2024-12-10

## 2024-11-19 NOTE — PROGRESS NOTES
"Patient is 65 yo F who presents to clinic for evaluation of low back pain. She had previous Lumbar spinal fusion for scoliosis. States this was about 15 years ago with a Surgeon in [place]. Patient reports pain in low back which shoots down her buttocks and into BLLE. States she has some "catching" has had multiple falls. She has been ambulating with a cane. Patient initially improved after surgery, and slowly over the last few months has deteriorated. She reports pain shooting down intter thigh.        concerned that the massager dislodged her screws.     Patient presented to pain management physician about 4 years ago.     Has had previous JAYLIN      Pt. Takes fioricet for HA.     Balance problems due to burning and tearing pain in her muscles.     Had previous BL hip surgery and previous lung resection (after inhaling a corn nut)             Pertinent positive and negative ROS documented above in HPI, all other systems reviewed and found to be negative.         Constitutional/ General: Awake, alert, oriented X 3. Sitting upright in No acute distress. Well developed/well nourished    Neck: trachea midline. ROM INTACT    Respiratory: No increased work of breathing on room air. Chest expansion equal and symmetric.    Neuro: AAO X3 speech is fluent and appripriate CN II-XII grossly intact throughout. EOMI. Face symmetric tongue midline. Shoulder shrug equal and intact BL. Follows commands and moves all extremities antigravity.    Extremities:No cyanosis clubbing or edema. Ambulating with RW    A/P:    Kelly is a 64-year-old female who presents to neurosurgery clinic today for evaluation of acute flare-up of chronic low back pain with radiculopathy.  Patient has had previous spinal surgery years ago involving instrumentation with an outside surgeon.  She continues to have back pain leg pain falls as well as requiring a cane to get around.  I would like to obtain MRI of the lumbar spine as well as x-rays to " evaluate for compromise of neural elements and current status of instrumentation.  Tramadol and muscle relaxer p.r.n. we will prescribe Lyrica as well as a steroid pack.      I advised on signs and symptoms that prompt urgent medical attention the patient expressed understanding and agreement with plan of care she will follow-up after imaging studies are complete.      Attestation:  ISteph PA-C have obtained HPI, performed Physical Examination on the above patient, reviewed the pertinent labs, tests, imaging, other relevant data and recorded my findings in this clinic note.      This note was produced using dictation software of voice recognition technology, and some typographical errors may be present.

## 2024-12-17 ENCOUNTER — HOSPITAL ENCOUNTER (OUTPATIENT)
Dept: RADIOLOGY | Facility: HOSPITAL | Age: 64
Discharge: HOME | End: 2024-12-17
Attending: PHYSICIAN ASSISTANT
Payer: COMMERCIAL

## 2024-12-17 ENCOUNTER — OFFICE VISIT (OUTPATIENT)
Dept: NEUROSURGERY | Facility: CLINIC | Age: 64
End: 2024-12-17
Payer: COMMERCIAL

## 2024-12-17 VITALS
DIASTOLIC BLOOD PRESSURE: 81 MMHG | SYSTOLIC BLOOD PRESSURE: 133 MMHG | WEIGHT: 160.94 LBS | BODY MASS INDEX: 29.44 KG/M2 | HEART RATE: 98 BPM

## 2024-12-17 DIAGNOSIS — M54.41 CHRONIC BILATERAL LOW BACK PAIN WITH BILATERAL SCIATICA: Primary | ICD-10-CM

## 2024-12-17 DIAGNOSIS — M54.9 DORSALGIA, UNSPECIFIED: ICD-10-CM

## 2024-12-17 DIAGNOSIS — M54.42 CHRONIC BILATERAL LOW BACK PAIN WITH BILATERAL SCIATICA: Primary | ICD-10-CM

## 2024-12-17 DIAGNOSIS — G89.29 CHRONIC BILATERAL LOW BACK PAIN WITH BILATERAL SCIATICA: Primary | ICD-10-CM

## 2024-12-17 PROCEDURE — 99215 OFFICE O/P EST HI 40 MIN: CPT | Mod: S$GLB,,, | Performed by: PHYSICIAN ASSISTANT

## 2024-12-17 PROCEDURE — 1159F MED LIST DOCD IN RCRD: CPT | Mod: CPTII,S$GLB,, | Performed by: PHYSICIAN ASSISTANT

## 2024-12-17 PROCEDURE — 3075F SYST BP GE 130 - 139MM HG: CPT | Mod: CPTII,S$GLB,, | Performed by: PHYSICIAN ASSISTANT

## 2024-12-17 PROCEDURE — 99999 PR PBB SHADOW E&M-EST. PATIENT-LVL IV: CPT | Mod: PBBFAC,,, | Performed by: PHYSICIAN ASSISTANT

## 2024-12-17 PROCEDURE — 25500020 PHARM REV CODE 255: Performed by: PHYSICIAN ASSISTANT

## 2024-12-17 PROCEDURE — 72158 MRI LUMBAR SPINE W/O & W/DYE: CPT | Mod: 26,,, | Performed by: STUDENT IN AN ORGANIZED HEALTH CARE EDUCATION/TRAINING PROGRAM

## 2024-12-17 PROCEDURE — 72158 MRI LUMBAR SPINE W/O & W/DYE: CPT | Mod: TC

## 2024-12-17 PROCEDURE — 3079F DIAST BP 80-89 MM HG: CPT | Mod: CPTII,S$GLB,, | Performed by: PHYSICIAN ASSISTANT

## 2024-12-17 PROCEDURE — A9585 GADOBUTROL INJECTION: HCPCS | Performed by: PHYSICIAN ASSISTANT

## 2024-12-17 PROCEDURE — 3044F HG A1C LEVEL LT 7.0%: CPT | Mod: CPTII,S$GLB,, | Performed by: PHYSICIAN ASSISTANT

## 2024-12-17 PROCEDURE — 3008F BODY MASS INDEX DOCD: CPT | Mod: CPTII,S$GLB,, | Performed by: PHYSICIAN ASSISTANT

## 2024-12-17 RX ORDER — GADOBUTROL 604.72 MG/ML
10 INJECTION INTRAVENOUS
Status: COMPLETED | OUTPATIENT
Start: 2024-12-17 | End: 2024-12-17

## 2024-12-17 RX ORDER — TRAMADOL HYDROCHLORIDE 50 MG/1
50 TABLET ORAL EVERY 4 HOURS PRN
Qty: 30 TABLET | Refills: 0 | Status: SHIPPED | OUTPATIENT
Start: 2024-12-17

## 2024-12-17 RX ADMIN — GADOBUTROL 6 ML: 604.72 INJECTION INTRAVENOUS at 10:12

## 2024-12-17 NOTE — PROGRESS NOTES
"BL Leg pain. Legs give out on her. Pain.       Patient trying to lose weight to help with her pain. She continues to walk using RW. Having significant pain and overall debility, unable to perform cleaning etc...       Patient weight fluctuates.         PREVIOUS HPI:      Patient is 65 yo F who presents to clinic for evaluation of low back pain. She had previous Lumbar spinal fusion for scoliosis. States this was about 15 years ago with a Surgeon in [place]. Patient reports pain in low back which shoots down her buttocks and into BLLE. States she has some "catching" has had multiple falls. She has been ambulating with a cane. Patient initially improved after surgery, and slowly over the last few months has deteriorated. She reports pain shooting down intter thigh.        concerned that the massager dislodged her screws.     Patient presented to pain management physician about 4 years ago.     Has had previous JAYLIN      Pt. Takes fioricet for HA.     Balance problems due to burning and tearing pain in her muscles.     Had previous BL hip surgery and previous lung resection (after inhaling a corn nut)             Pertinent positive and negative ROS documented above in HPI, all other systems reviewed and found to be negative.         Constitutional/ General: Awake, alert, oriented X 3. Sitting upright in No acute distress. Well developed/well nourished    Neck: trachea midline. ROM INTACT    Respiratory: No increased work of breathing on room air. Chest expansion equal and symmetric.    Neuro: AAO X3 speech is fluent and appripriate CN II-XII grossly intact throughout. EOMI. Face symmetric tongue midline. Shoulder shrug equal and intact BL. Follows commands and moves all extremities antigravity.    Extremities:No cyanosis clubbing or edema. Ambulating with RW          mpression:     1.    L3-4 disc extrusion as above results in severe spinal canal stenosis and moderate to severe bilateral neuroforaminal " narrowing.  2.     L2-3 disc protrusion/extrusion results in moderate to severe spinal canal stenosis.  Associated bunching of the cauda equina nerve roots at the level of L2.  3.    Changes of L4-5 anterior and posterior fusion.  4.    Multilevel varying degrees of bilateral neuroforaminal narrowing as described in the level by level details above.  5.    No abnormal postcontrast enhancement at the surgical site.     Finalized on: 12/17/2024 11:34 AM By:  Jennyfer Pedersen MD  [facility]# [id number]      2024-12-17 11:36:[id number]    [facility]        Exam Ended: 12/17/24 10:21 CST Last Resulted: 12/17/24 11:34 CST       Order Details        View Encounter        Lab and Collection Details        Routing        Result History     View All Conversations on this Encounter     Result Care Coordination      Patient Communication     Add Comments   Add Notifications  Back to Top       MRI Lumbar Spine W WO Contrast: Patient Communication     Add Comments   Add Notifications      External Result Report    External Result Report     Narrative & Impression    PROCEDURE:  MRI LUMBAR SPINE W WO CONTRAST     INDICATION:  Pain     TECHNIQUE: MRI LUMBAR SPINE W WO CONTRAST. Contrast agent: Gadavist.  COMPARISON: None available.     FINDINGS:  Alignment is unremarkable.  Changes of L4-5 posterior and anterior fusion.  No evidence of aggressive osseous lesion or acute compression deformity.  Vertebral body heights are maintained.  Multilevel disc height loss, most pronounced at L3-4 where it is moderate to severe.  With exception of mild postcontrast enhancement about the below described disc protrusions/extrusions, there is no additional evidence of abnormal focal enhancement. Conus medullaris tip is at L1-2.  Bunching of the cauda equina nerve roots at the level of L2 due to below described spinal canal stenosis.     T12-L1: No significant spinal canal stenosis or neuroforaminal narrowing.     L1-2: No significant spinal canal stenosis or neuroforaminal  narrowing.     L2-3: Small to moderate concentric disc bulge superimposed left paracentral disc protrusion/extrusion.  Moderate spinal canal stenosis.  Mild left neuroforaminal narrowing.     L3-4: Moderate concentric disc bulge with superimposed left paracentral disc extrusion with cephalad migration measuring 2.0 cm in CC dimension.  Severe spinal canal stenosis measuring 6 mm in AP dimension.  Moderate to severe bilateral neural foraminal narrowing.  Moderate bilateral facet arthropathy and ligamentum flavum thickening.      L4-5: Instrumented level.  Small concentric disc bulge flattens the ventral thecal sac.  No significant spinal canal stenosis.  Mild bilateral neuroforaminal narrowing.  Moderate bilateral facet arthropathy and ligamentum flavum thickening.     L5-S1: Trace concentric disc bulge with no significant spinal canal stenosis.  Mild bilateral neuroforaminal narrowing.  Moderate to severe bilateral facet arthropathy and ligamentum flavum thickening..     Paravertebral soft tissues are unremarkable.        Impression:     1.    L3-4 disc extrusion as above results in severe spinal canal stenosis and moderate to severe bilateral neuroforaminal narrowing.  2.     L2-3 disc protrusion/extrusion results in moderate to severe spinal canal stenosis.  Associated bunching of the cauda equina nerve roots at the level of L2.  3.    Changes of L4-5 anterior and posterior fusion.  4.    Multilevel varying degrees of bilateral neuroforaminal narrowing as described in the level by level details above.  5.    No abnormal postcontrast enhancement at the surgical site.     Finalized on: 12/17/2024 11:34 AM By:  Jennyfer Pedersen MD  [facility]# [id number]      2024-12-17 11:36:[id number]    [facility]    Encounter    View Encounter             A/P:    Kelly is a 64-year-old female who presents to neurosurgery clinic today for evaluation of acute flare-up of chronic low back pain with radiculopathy.  Patient has had previous spinal surgery years  ago involving instrumentation with an outside surgeon.  She continues to have back pain leg pain falls as well as requiring a cane to get around.  I would like to obtain MRI of the lumbar spine as well as x-rays to evaluate for compromise of neural elements and current status of instrumentation.  Tramadol and muscle relaxer p.r.n. we will prescribe Lyrica           I reviewed MRI lumbar spine which shows previous L4-L5 instrumentation pedicle screws as well as a lif cage.  MRIs shows central spinal stenosis at L2-3 and L3-4 due to disc protrusion. There is in indication warranting acute neurosurgical intervention at this time. there is adjacent segment stenosis above the level of prior construct at L2-3 and L3-4 with narrowing of the foramen bilaterally.  I recommend bilateral transforaminal epidural steroid injections at this level with pain management additionally I would like the patient to see pain management and consultation to advise on medical management with medications as well as their input for treatment options I advised patient to continue Lyrica for neuropathic pain medication I refill muscle relaxer and tramadol pain procedure order placed she will follow up after injections      Explained that we should proceed with conservative measure and then address if surgery indicated at that time.      Patient did not undergo x-rays that were ordered last visit, states she will obtain these x-rays after the holidays.          I advised on signs and symptoms that prompt urgent medical attention the patient expressed understanding and agreement with plan of care she will follow-up after imaging studies are complete.      Attestation:  Steph JADE PA-C have obtained HPI, performed Physical Examination on the above patient, reviewed the pertinent labs, tests, imaging, other relevant data and recorded my findings in this clinic note.      This note was produced using dictation software of voice recognition  technology, and some typographical errors may be present.

## 2024-12-19 DIAGNOSIS — M54.16 LUMBAR RADICULOPATHY: Primary | ICD-10-CM

## 2025-01-02 ENCOUNTER — HOSPITAL ENCOUNTER (OUTPATIENT)
Dept: RADIOLOGY | Facility: HOSPITAL | Age: 65
Discharge: HOME | End: 2025-01-02
Attending: PHYSICIAN ASSISTANT
Payer: COMMERCIAL

## 2025-01-02 DIAGNOSIS — Z98.1 HISTORY OF LUMBAR FUSION: ICD-10-CM

## 2025-01-02 PROCEDURE — 72100 X-RAY EXAM L-S SPINE 2/3 VWS: CPT | Mod: 26,,, | Performed by: RADIOLOGY

## 2025-01-02 PROCEDURE — 72100 X-RAY EXAM L-S SPINE 2/3 VWS: CPT | Mod: TC

## 2025-01-03 ENCOUNTER — PATIENT MESSAGE (OUTPATIENT)
Dept: PAIN MEDICINE | Facility: CLINIC | Age: 65
End: 2025-01-03
Payer: COMMERCIAL

## 2025-01-15 RX ORDER — NAPROXEN SODIUM 220 MG/1
81 TABLET, FILM COATED ORAL DAILY
COMMUNITY

## 2025-01-28 NOTE — PRE-PROCEDURE INSTRUCTIONS
Spoke with patient regarding procedure scheduled on 1.31     Arrival time 1000     Has patient been sick with fever or on antibiotics within the last 7 days? No     Does the patient have any open wounds, sores or rashes? No     Does the patient have any recent fractures? no     Has patient received a vaccination within the last 7 days? No     Received the COVID vaccination? yes     Has the patient stopped all medications as directed? HOLD ASA 5 DAYS PRIOR TO PROCEDURE. CARDIAC CLEARANCE OBTAINED FROM DR BURKETT ON 1.27     Does patient have a pacemaker, defibrillator, or implantable stimulator? No     Does the patient have a ride to and from procedure and someone reliable to remain with patient?       Is the patient diabetic? no      Does the patient have sleep apnea? Or use O2 at home? no     Is the patient receiving sedation? Yes      Is the patient instructed to remain NPO beginning at midnight the night before their procedure? yes     Procedure location confirmed with patient? Yes     Covid- Denies signs/symptoms. Instructed to notify PAT/MD if any changes.

## 2025-01-31 ENCOUNTER — HOSPITAL ENCOUNTER (OUTPATIENT)
Facility: HOSPITAL | Age: 65
Discharge: HOME | End: 2025-01-31
Attending: ANESTHESIOLOGY | Admitting: ANESTHESIOLOGY
Payer: COMMERCIAL

## 2025-01-31 VITALS
WEIGHT: 171.63 LBS | BODY MASS INDEX: 31.58 KG/M2 | HEIGHT: 62 IN | HEART RATE: 78 BPM | TEMPERATURE: 97 F | DIASTOLIC BLOOD PRESSURE: 82 MMHG | SYSTOLIC BLOOD PRESSURE: 151 MMHG | RESPIRATION RATE: 17 BRPM | OXYGEN SATURATION: 99 %

## 2025-01-31 DIAGNOSIS — M54.16 LUMBAR RADICULOPATHY: ICD-10-CM

## 2025-01-31 PROCEDURE — 25500020 PHARM REV CODE 255: Performed by: ANESTHESIOLOGY

## 2025-01-31 PROCEDURE — 64483 NJX AA&/STRD TFRM EPI L/S 1: CPT | Mod: 50,,, | Performed by: ANESTHESIOLOGY

## 2025-01-31 PROCEDURE — 25000003 PHARM REV CODE 250: Performed by: ANESTHESIOLOGY

## 2025-01-31 PROCEDURE — 63600175 PHARM REV CODE 636 W HCPCS: Performed by: ANESTHESIOLOGY

## 2025-01-31 PROCEDURE — 64483 NJX AA&/STRD TFRM EPI L/S 1: CPT | Mod: 50 | Performed by: ANESTHESIOLOGY

## 2025-01-31 RX ORDER — FENTANYL CITRATE 50 UG/ML
INJECTION, SOLUTION INTRAMUSCULAR; INTRAVENOUS
Status: DISCONTINUED | OUTPATIENT
Start: 2025-01-31 | End: 2025-01-31 | Stop reason: HOSPADM

## 2025-01-31 RX ORDER — DEXAMETHASONE SODIUM PHOSPHATE 10 MG/ML
INJECTION, SOLUTION INTRA-ARTICULAR; INTRALESIONAL; INTRAMUSCULAR; INTRAVENOUS; SOFT TISSUE
Status: DISCONTINUED | OUTPATIENT
Start: 2025-01-31 | End: 2025-01-31 | Stop reason: HOSPADM

## 2025-01-31 RX ORDER — MIDAZOLAM HYDROCHLORIDE 1 MG/ML
INJECTION, SOLUTION INTRAMUSCULAR; INTRAVENOUS
Status: DISCONTINUED | OUTPATIENT
Start: 2025-01-31 | End: 2025-01-31 | Stop reason: HOSPADM

## 2025-01-31 RX ORDER — INDOMETHACIN 25 MG/1
CAPSULE ORAL
Status: DISCONTINUED | OUTPATIENT
Start: 2025-01-31 | End: 2025-01-31 | Stop reason: HOSPADM

## 2025-01-31 RX ORDER — BUPIVACAINE HYDROCHLORIDE 2.5 MG/ML
INJECTION, SOLUTION EPIDURAL; INFILTRATION; INTRACAUDAL
Status: DISCONTINUED | OUTPATIENT
Start: 2025-01-31 | End: 2025-01-31 | Stop reason: HOSPADM

## 2025-01-31 NOTE — H&P
HPI  Patient presenting for Procedure(s) (LRB):  Bilateral L3-4 TF JAYLIN (Bilateral)     Patient on Anti-coagulation No    No health changes since previous encounter    Past Medical History:   Diagnosis Date    Allergy     Anxiety     Asthma     Bipolar disorder     Chronic low back pain     Depression     Deviated septum     Disorder of kidney and ureter     Diverticulitis     DJD (degenerative joint disease)     Encounter for blood transfusion     GERD (gastroesophageal reflux disease)     Hyperlipidemia     Hypothyroidism     Neck pain     Osteoarthritis      Past Surgical History:   Procedure Laterality Date    ABCESS DRAINAGE      CATARACT EXTRACTION      DILATION AND CURETTAGE OF UTERUS      EXTRACTION OF TOOTH      EYE SURGERY  01/2006    Cataract Surgery Both Eyes Not Done On Same Day    HIP SURGERY      HYSTERECTOMY      JOINT REPLACEMENT  12/22/2015 & 11/03/2016    Right Hip first then Left Hip    LUMBAR DISC SURGERY      NASAL SEPTOPLASTY      OPEN RESECTION OF LUNG      SINUS SURGERY      SPINE SURGERY  08/2012    Lower Back Surgery    turbinectomy       Review of patient's allergies indicates:   Allergen Reactions    Codeine Hives    Skelaxin [metaxalone]     Tylenol-codeine #4 [acetaminophen-codeine] Rash        No current facility-administered medications on file prior to encounter.     Current Outpatient Medications on File Prior to Encounter   Medication Sig Dispense Refill    albuterol-ipratropium (DUO-NEB) 2.5 mg-0.5 mg/3 mL nebulizer solution Take 3 mLs by nebulization every 6 (six) hours as needed for Wheezing. Rescue 30 each 0    ALPRAZolam (XANAX) 0.5 MG tablet Take 1 tablet (0.5 mg total) by mouth 2 (two) times daily. 60 tablet 2    aspirin 81 MG Chew Take 81 mg by mouth once daily.      azelastine (ASTELIN) 137 mcg (0.1 %) nasal spray INSTILL 2 SPRAYS IN EACH NOSTRIL DAILY 30 mL 5    butalbital-acetaminophen-caffeine -40 mg (FIORICET, ESGIC) -40 mg per tablet Take 1 tablet by mouth  daily as needed for Headaches. 30 tablet 2    DULoxetine (CYMBALTA) 60 MG capsule Take 1 capsule (60 mg total) by mouth once daily. 90 capsule 1    fluticasone-umeclidin-vilanter (TRELEGY ELLIPTA) 200-62.5-25 mcg inhaler Inhale 1 puff into the lungs once daily. 28 each 5    hydroxychloroquine (PLAQUENIL) 200 mg tablet Take 1 tablet (200 mg total) by mouth 2 (two) times daily. 180 tablet 1    hydrOXYzine HCL (ATARAX) 25 MG tablet Take 1 tablet (25 mg total) by mouth 3 (three) times daily. 60 tablet 2    levothyroxine (SYNTHROID) 50 MCG tablet Take 1 tablet (50 mcg total) by mouth once daily. 90 tablet 1    omeprazole (PRILOSEC) 40 MG capsule Take 1 capsule (40 mg total) by mouth once daily. 90 capsule 1    pravastatin (PRAVACHOL) 20 MG tablet Take 1 tablet (20 mg total) by mouth once daily. 90 tablet 1    pregabalin (LYRICA) 50 MG capsule Take 1 capsule (50 mg total) by mouth 3 (three) times daily. 90 capsule 6    QUEtiapine (SEROQUEL XR) 400 MG Tb24 Take 1 tablet (400 mg total) by mouth once daily. 90 tablet 1    traMADoL (ULTRAM) 50 mg tablet Take 1 tablet (50 mg total) by mouth every 4 (four) hours as needed for Pain. 28 tablet 0    traMADoL (ULTRAM) 50 mg tablet Take 1 tablet (50 mg total) by mouth every 4 (four) hours as needed for Pain. 30 tablet 0        PMHx, PSHx, Allergies, Medications reviewed in epic    ROS negative except pain complaints in HPI    OBJECTIVE:    There were no vitals taken for this visit.    PHYSICAL EXAMINATION:    GENERAL: Well appearing, in no acute distress, alert and oriented x3.  PSYCH:  Mood and affect appropriate.  SKIN: Skin color, texture, turgor normal, no rashes or lesions which will impact the procedure.  CV: RRR with palpation of the radial artery.  PULM: No evidence of respiratory difficulty, symmetric chest rise. Clear to auscultation.  NEURO: Cranial nerves grossly intact.    Plan:    Proceed with procedure as planned Procedure(s) (LRB):  Bilateral L3-4 TF JAYLIN  (Bilateral)    Jaydon Gamboa MD  01/31/2025

## 2025-01-31 NOTE — DISCHARGE INSTRUCTIONS

## 2025-01-31 NOTE — DISCHARGE SUMMARY
Discharge Note  Short Stay      SUMMARY     Admit Date: 1/31/2025    Attending Physician: Jaydon Gamboa MD        Discharge Physician: Jaydon Gamboa MD        Discharge Date: 1/31/2025 10:40 AM    Procedure(s) (LRB):  Bilateral L3-4 TF JAYLIN (Bilateral)    Final Diagnosis: Lumbar radiculopathy [M54.16]    Disposition: Home or self care    Patient Instructions:   Current Discharge Medication List        CONTINUE these medications which have NOT CHANGED    Details   albuterol (PROVENTIL/VENTOLIN HFA) 90 mcg/actuation inhaler TAKE 2 PUFFS EVERY 6 HOURS AS NEEDED FOR WHEEZE  Qty: 18 g, Refills: 2    Associated Diagnoses: Moderate persistent asthma without complication      albuterol-ipratropium (DUO-NEB) 2.5 mg-0.5 mg/3 mL nebulizer solution Take 3 mLs by nebulization every 6 (six) hours as needed for Wheezing. Rescue  Qty: 30 each, Refills: 0    Associated Diagnoses: Moderate persistent asthma without complication      ALPRAZolam (XANAX) 0.5 MG tablet Take 1 tablet (0.5 mg total) by mouth 2 (two) times daily.  Qty: 60 tablet, Refills: 2    Associated Diagnoses: Bipolar disorder, current episode mixed, moderate      butalbital-acetaminophen-caffeine -40 mg (FIORICET, ESGIC) -40 mg per tablet Take 1 tablet by mouth daily as needed for Headaches.  Qty: 30 tablet, Refills: 2    Associated Diagnoses: Episodic tension-type headache, not intractable      DULoxetine (CYMBALTA) 60 MG capsule Take 1 capsule (60 mg total) by mouth once daily.  Qty: 90 capsule, Refills: 1    Associated Diagnoses: Bipolar disorder, current episode mixed, moderate      fluticasone-umeclidin-vilanter (TRELEGY ELLIPTA) 200-62.5-25 mcg inhaler Inhale 1 puff into the lungs once daily.  Qty: 28 each, Refills: 5    Associated Diagnoses: Moderate persistent asthma with acute exacerbation      hydroxychloroquine (PLAQUENIL) 200 mg tablet Take 1 tablet (200 mg total) by mouth 2 (two) times daily.  Qty: 180 tablet, Refills: 1    Associated  Diagnoses: Erosive osteoarthritis      levothyroxine (SYNTHROID) 50 MCG tablet Take 1 tablet (50 mcg total) by mouth once daily.  Qty: 90 tablet, Refills: 1    Associated Diagnoses: Acquired hypothyroidism      meloxicam (MOBIC) 15 MG tablet TAKE 1 TABLET BY MOUTH EVERY DAY AS NEEDED FOR PAIN  Qty: 90 tablet, Refills: 1    Associated Diagnoses: Rheumatoid arthritis of multiple sites with negative rheumatoid factor      montelukast (SINGULAIR) 10 mg tablet TAKE 1 TABLET BY MOUTH EVERY EVENING  Qty: 90 tablet, Refills: 1    Associated Diagnoses: Moderate persistent asthma without complication      omeprazole (PRILOSEC) 40 MG capsule Take 1 capsule (40 mg total) by mouth once daily.  Qty: 90 capsule, Refills: 1    Associated Diagnoses: Gastroesophageal reflux disease without esophagitis      pravastatin (PRAVACHOL) 20 MG tablet Take 1 tablet (20 mg total) by mouth once daily.  Qty: 90 tablet, Refills: 1    Associated Diagnoses: Mixed hyperlipidemia      pregabalin (LYRICA) 50 MG capsule Take 1 capsule (50 mg total) by mouth 3 (three) times daily.  Qty: 90 capsule, Refills: 6    Associated Diagnoses: History of lumbar fusion      QUEtiapine (SEROQUEL XR) 400 MG Tb24 Take 1 tablet (400 mg total) by mouth once daily.  Qty: 90 tablet, Refills: 1    Associated Diagnoses: Bipolar disorder, current episode mixed, moderate      tiZANidine (ZANAFLEX) 4 MG tablet TAKE 1 OR 2 TABLETS BY MOUTH AT BEDTIME AS NEEDED FOR BACK PAIN  Qty: 60 tablet, Refills: 0    Associated Diagnoses: Rheumatoid arthritis of multiple sites with negative rheumatoid factor      !! traMADoL (ULTRAM) 50 mg tablet Take 1 tablet (50 mg total) by mouth every 4 (four) hours as needed for Pain.  Qty: 28 tablet, Refills: 0    Comments: Quantity prescribed more than 7 day supply? No  Associated Diagnoses: History of lumbar fusion      VRAYLAR 3 mg Cap TAKE 1 CAPSULE BY MOUTH DAILY.  Qty: 90 capsule, Refills: 1    Associated Diagnoses: Bipolar disorder, current  episode mixed, moderate      aspirin 81 MG Chew Take 81 mg by mouth once daily.      azelastine (ASTELIN) 137 mcg (0.1 %) nasal spray INSTILL 2 SPRAYS IN EACH NOSTRIL DAILY  Qty: 30 mL, Refills: 5    Associated Diagnoses: Seasonal allergic rhinitis due to other allergic trigger      hydrOXYzine HCL (ATARAX) 25 MG tablet Take 1 tablet (25 mg total) by mouth 3 (three) times daily.  Qty: 60 tablet, Refills: 2    Associated Diagnoses: Bipolar disorder, current episode mixed, moderate      !! traMADoL (ULTRAM) 50 mg tablet Take 1 tablet (50 mg total) by mouth every 4 (four) hours as needed for Pain.  Qty: 30 tablet, Refills: 0    Comments: Quantity prescribed more than 7 day supply? No  Associated Diagnoses: Chronic bilateral low back pain with bilateral sciatica       !! - Potential duplicate medications found. Please discuss with provider.              Discharge Diagnosis: Lumbar radiculopathy [M54.16]  Condition on Discharge: Stable with no complications to procedure   Diet on Discharge: Same as before.  Activity: as per instruction sheet.  Discharge to: Home with a responsible adult.  Follow up: 2-4 weeks       Please call the office at [phone] if you experience any weakness or loss of sensation, fever > 101.5, pain uncontrolled with oral medications, persistent nausea/vomiting/or diarrhea, redness or drainage from the incisions, or any other worrisome concerns. If physician on call was not reached or could not communicate with our office for any reason please go to the nearest emergency department

## 2025-01-31 NOTE — OP NOTE
Kelly Gilmore  64 y.o. female      Vitals:    01/31/25 1036   BP: 138/72   Pulse: 75   Resp: 14   Temp:      Procedure Date: 01/31/2025        INFORMED CONSENT: The procedure, risks, benefits and options were discussed with patient. There are no contraindications to the procedure. The patient expressed understanding and agreed to proceed. The personnel performing the procedure was discussed. I verify that I personally obtained consent prior to the start of the procedure and the signed consent can be found on the patient's chart.       Anesthesia:   Conscious sedation provided by M.D    The patient was monitored with continuous pulse oximetry, EKG, and intermittent blood pressure monitors.  The patient was hemodynamically stable throughout the entire process was responsive to voice, and breathing spontaneously.  Supplemental O2 was provided at 2L/min via nasal cannula.  Patient was comfortable for the duration of the procedure. (See nurse documentation and case log for sedation time)    There was a total of 1mg IV Midazolam and 50mcg Fentanyl titrated for the procedure    Pre Procedure diagnosis: Lumbar radiculopathy [M54.16]  Post-Procedure diagnosis: SAME     Complications: None    Specimens: None      DESCRIPTION OF PROCEDURE: The patient was brought to the procedure room. IV access was obtained prior to the procedure. The patient was positioned prone on the fluoroscopy table. Continuous hemodynamic monitoring was initiated including blood pressure, EKG, and pulse oximetry. . The skin was prepped with chlorhexidine and draped in a sterile fashion.      The  L3/4  transforaminal spaces were identified with fluoroscopy in the  AP, oblique, and lateral views.  A 22 gauge spinal quinke needle was then advanced into the area of the trans foraminal spaces bilateral with confirmation of proper needle position using AP, oblique, and lateral fluoroscopic views. Once the needle tip was in the area of the transforaminal  space, and there was no blood, CSF or paraesthesias,  1.5 mL of Omnipaque 300mg/ml was injected on bilateral for a total of 3mL.  Fluoroscopic imaging in the AP and lateral views revealed a clear outline of the spinal nerve with proximal spread of agent through the neural foramen into the epidural space. A total combination of 2mL of Bupivacaine and 10 mg dexamethasone was injected on each side and at each level with displacement of the contrast dye confirming that the medication went into the area of the transforaminal spaces bilateral. A sterile bandaid was applied.   Patient tolerated the procedure well.    Patient was taken back to the recovery room for further observation.     The patient was discharged to home in stable condition

## 2025-02-18 ENCOUNTER — OFFICE VISIT (OUTPATIENT)
Dept: NEUROSURGERY | Facility: CLINIC | Age: 65
End: 2025-02-18
Payer: COMMERCIAL

## 2025-02-18 VITALS
DIASTOLIC BLOOD PRESSURE: 80 MMHG | BODY MASS INDEX: 32.9 KG/M2 | WEIGHT: 179.88 LBS | HEART RATE: 78 BPM | SYSTOLIC BLOOD PRESSURE: 135 MMHG

## 2025-02-18 DIAGNOSIS — M54.9 DORSALGIA, UNSPECIFIED: Primary | ICD-10-CM

## 2025-02-18 NOTE — PROGRESS NOTES
"Patient returns for follow up low back pain. She has completed injections with Dr. Gamboa about 3 weeks ago on1/31/25. Patient reports improvement in symptoms for 1 week. States she was walking without her cane for the first week. Relief for about 3 weeks- states about 75 percent relief.  Now continues to have at least 30 percent relief. Lyrica taking it TID and sometimes BID. States she has flare ups with the weather change especially rain, has to take 3 times a day.       Patient also recently had injections in her BL knees with Dr. Bunch at [facility] in December.     Feb 12 was steroid injections into her knees BL. However now having grinding sensation in her knees.     Continues to have back pain and occasional leg pain. But does report significant relief of her symptoms since           PREVIOUS HPI:      BL Leg pain. Legs give out on her. Pain.       Patient trying to lose weight to help with her pain. She continues to walk using RW. Having significant pain and overall debility, unable to perform cleaning etc...       Patient weight fluctuates.         PREVIOUS HPI:      Patient is 65 yo F who presents to clinic for evaluation of low back pain. She had previous Lumbar spinal fusion for scoliosis. States this was about 15 years ago with a Surgeon in [place]. Patient reports pain in low back which shoots down her buttocks and into BLLE. States she has some "catching" has had multiple falls. She has been ambulating with a cane. Patient initially improved after surgery, and slowly over the last few months has deteriorated. She reports pain shooting down intter thigh.        concerned that the massager dislodged her screws.     Patient presented to pain management physician about 4 years ago.     Has had previous JAYLIN      Pt. Takes fioricet for HA.     Balance problems due to burning and tearing pain in her muscles.     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         Constitutional/ General: Awake, alert, oriented X 3. Sitting upright in No acute distress. Well developed/well nourished    Neck: trachea midline. ROM INTACT    Respiratory: No increased work of breathing on room air. Chest expansion equal and symmetric.    Neuro: AAO X3 speech is fluent and appripriate CN II-XII grossly intact throughout. EOMI. Face symmetric tongue midline. Shoulder shrug equal and intact BL. Follows commands and moves all extremities antigravity.    Extremities:No cyanosis clubbing or edema. Ambulating with RW          mpression:     1.    L3-4 disc extrusion as above results in severe spinal canal stenosis and moderate to severe bilateral neuroforaminal narrowing.  2.     L2-3 disc protrusion/extrusion results in moderate to severe spinal canal stenosis.  Associated bunching of the cauda equina nerve roots at the level of L2.  3.    Changes of L4-5 anterior and posterior fusion.  4.    Multilevel varying degrees of bilateral neuroforaminal narrowing as described in the level by level details above.  5.    No abnormal postcontrast enhancement at the surgical site.     Finalized on: 12/17/2024 11:34 AM By:  Jennyfer Pedersen MD  [facility]# [id number]      2024-12-17 11:36:[id number]    [facility]        Exam Ended: 12/17/24 10:21 CST Last Resulted: 12/17/24 11:34 CST       Order Details        View Encounter        Lab and Collection Details        Routing        Result History     View All Conversations on this Encounter     Result Care Coordination      Patient Communication     Add Comments   Add Notifications  Back to Top       MRI Lumbar Spine W WO Contrast: Patient Communication     Add Comments   Add Notifications      External Result Report    External Result Report     Narrative & Impression    PROCEDURE:  MRI LUMBAR SPINE W WO CONTRAST     INDICATION:  Pain     TECHNIQUE: MRI LUMBAR SPINE W WO  CONTRAST. Contrast agent: Gadavist.  COMPARISON: None available.     FINDINGS:  Alignment is unremarkable.  Changes of L4-5 posterior and anterior fusion.  No evidence of aggressive osseous lesion or acute compression deformity.  Vertebral body heights are maintained.  Multilevel disc height loss, most pronounced at L3-4 where it is moderate to severe.  With exception of mild postcontrast enhancement about the below described disc protrusions/extrusions, there is no additional evidence of abnormal focal enhancement. Conus medullaris tip is at L1-2.  Bunching of the cauda equina nerve roots at the level of L2 due to below described spinal canal stenosis.     T12-L1: No significant spinal canal stenosis or neuroforaminal narrowing.     L1-2: No significant spinal canal stenosis or neuroforaminal narrowing.     L2-3: Small to moderate concentric disc bulge superimposed left paracentral disc protrusion/extrusion.  Moderate spinal canal stenosis.  Mild left neuroforaminal narrowing.     L3-4: Moderate concentric disc bulge with superimposed left paracentral disc extrusion with cephalad migration measuring 2.0 cm in CC dimension.  Severe spinal canal stenosis measuring 6 mm in AP dimension.  Moderate to severe bilateral neural foraminal narrowing.  Moderate bilateral facet arthropathy and ligamentum flavum thickening.      L4-5: Instrumented level.  Small concentric disc bulge flattens the ventral thecal sac.  No significant spinal canal stenosis.  Mild bilateral neuroforaminal narrowing.  Moderate bilateral facet arthropathy and ligamentum flavum thickening.     L5-S1: Trace concentric disc bulge with no significant spinal canal stenosis.  Mild bilateral neuroforaminal narrowing.  Moderate to severe bilateral facet arthropathy and ligamentum flavum thickening..     Paravertebral soft tissues are unremarkable.        Impression:     1.    L3-4 disc extrusion as above results in severe spinal canal stenosis and moderate to  severe bilateral neuroforaminal narrowing.  2.     L2-3 disc protrusion/extrusion results in moderate to severe spinal canal stenosis.  Associated bunching of the cauda equina nerve roots at the level of L2.  3.    Changes of L4-5 anterior and posterior fusion.  4.    Multilevel varying degrees of bilateral neuroforaminal narrowing as described in the level by level details above.  5.    No abnormal postcontrast enhancement at the surgical site.     Finalized on: 12/17/2024 11:34 AM By:  Jennyfer Pedersen MD  [facility]# [id number]      2024-12-17 11:36:[id number]    [facility]    Encounter    View Encounter             A/P:    Kelly is a 64-year-old female who presents to neurosurgery clinic today for evaluation of acute flare-up of chronic low back pain with radiculopathy.  Patient has had previous spinal surgery years ago involving instrumentation with an outside surgeon.  She continues to have back pain leg pain falls as well as requiring a cane to get around.  I would like to obtain MRI of the lumbar spine as well as x-rays to evaluate for compromise of neural elements and current status of instrumentation.  Tramadol and muscle relaxer p.r.n. we will prescribe Lyrica           I reviewed MRI lumbar spine which shows previous L4-L5 instrumentation pedicle screws as well as a lif cage.  MRIs shows central spinal stenosis at L2-3 and L3-4 due to disc protrusion. There is in indication warranting acute neurosurgical intervention at this time. there is adjacent segment stenosis above the level of prior construct at L2-3 and L3-4 with narrowing of the foramen bilaterally.  I recommend bilateral transforaminal epidural steroid injections at this level with pain management additionally I would like the patient to see pain management and consultation to advise on medical management with medications as well as their input for treatment options I advised patient to continue Lyrica for neuropathic pain medication I refill muscle relaxer and tramadol  pain procedure order placed she will follow up after injections        UPDATE: Pt had injections with Dr. Gamboa with improvement in her symptoms. I would like to follow up with her in 4 months with CT L spine and scoliosis x rays prior to visit.        I advised on signs and symptoms that prompt urgent medical attention the patient expressed understanding and agreement with plan of care she will follow-up after imaging studies are complete.      Attestation:  ISteph PA-C have obtained HPI, performed Physical Examination on the above patient, reviewed the pertinent labs, tests, imaging, other relevant data and recorded my findings in this clinic note.      This note was produced using dictation software of voice recognition technology, and some typographical errors may be present.

## 2025-03-19 ENCOUNTER — OFFICE VISIT (OUTPATIENT)
Dept: PAIN MEDICINE | Facility: CLINIC | Age: 65
End: 2025-03-19
Payer: COMMERCIAL

## 2025-03-19 VITALS
SYSTOLIC BLOOD PRESSURE: 167 MMHG | HEART RATE: 81 BPM | WEIGHT: 184.31 LBS | BODY MASS INDEX: 33.92 KG/M2 | DIASTOLIC BLOOD PRESSURE: 82 MMHG | HEIGHT: 62 IN

## 2025-03-19 DIAGNOSIS — G89.29 CHRONIC BILATERAL LOW BACK PAIN WITH BILATERAL SCIATICA: ICD-10-CM

## 2025-03-19 DIAGNOSIS — M54.16 LUMBAR RADICULOPATHY: Primary | ICD-10-CM

## 2025-03-19 DIAGNOSIS — M54.41 CHRONIC BILATERAL LOW BACK PAIN WITH BILATERAL SCIATICA: ICD-10-CM

## 2025-03-19 DIAGNOSIS — Z98.1 HISTORY OF LUMBAR FUSION: ICD-10-CM

## 2025-03-19 DIAGNOSIS — M54.42 CHRONIC BILATERAL LOW BACK PAIN WITH BILATERAL SCIATICA: ICD-10-CM

## 2025-03-19 PROCEDURE — 1159F MED LIST DOCD IN RCRD: CPT | Mod: CPTII,S$GLB,, | Performed by: PHYSICAL MEDICINE & REHABILITATION

## 2025-03-19 PROCEDURE — 3077F SYST BP >= 140 MM HG: CPT | Mod: CPTII,S$GLB,, | Performed by: PHYSICAL MEDICINE & REHABILITATION

## 2025-03-19 PROCEDURE — G2211 COMPLEX E/M VISIT ADD ON: HCPCS | Mod: S$GLB,,, | Performed by: PHYSICAL MEDICINE & REHABILITATION

## 2025-03-19 PROCEDURE — 3008F BODY MASS INDEX DOCD: CPT | Mod: CPTII,S$GLB,, | Performed by: PHYSICAL MEDICINE & REHABILITATION

## 2025-03-19 PROCEDURE — 99999 PR PBB SHADOW E&M-EST. PATIENT-LVL IV: CPT | Mod: PBBFAC,,, | Performed by: PHYSICAL MEDICINE & REHABILITATION

## 2025-03-19 PROCEDURE — 99204 OFFICE O/P NEW MOD 45 MIN: CPT | Mod: S$GLB,,, | Performed by: PHYSICAL MEDICINE & REHABILITATION

## 2025-03-19 PROCEDURE — 3079F DIAST BP 80-89 MM HG: CPT | Mod: CPTII,S$GLB,, | Performed by: PHYSICAL MEDICINE & REHABILITATION

## 2025-03-19 RX ORDER — PREGABALIN 100 MG/1
100 CAPSULE ORAL 2 TIMES DAILY
Qty: 60 CAPSULE | Refills: 2 | Status: SHIPPED | OUTPATIENT
Start: 2025-03-19 | End: 2025-06-17

## 2025-03-19 NOTE — PROGRESS NOTES
New Patient Chronic Pain Note (Initial Visit)    Referring Physician: Steph Wilson PA-C    PCP: Jose Taylor FNP    Chief Complaint:   Chief Complaint   Patient presents with    Low-back Pain    Shoulder Pain    Neck Pain        SUBJECTIVE:    Kelly Gilmore is a 64 y.o. female who presents to the clinic for the evaluation of lower back and leg pain.  She was referred by Neurosurgery for further evaluation and management of this pain.  She has past medical history of lumbar radiculopathy, bipolar disorder, anxiety, depression, asthma, hyperlipidemia, CKD stage 3, rheumatoid arthritis, hypothyroidism, GERD, and multiple other medical comorbidities as listed in her chart.  The pain started several years ago and symptoms have been worsening.The pain is located in the lumbosacral area and radiates to the bilateral hips.  The pain is described as  sharp, stabbing, aching  and is rated as 8/10. The pain is rated with a score of  2/10 on the BEST day and a score of 10/10 on the WORST day.  Symptoms interfere with daily activity. The pain is exacerbated by lifting, bending, twisting.  The pain is mitigated by gentle movement and medication    Patient denies night fever/night sweats, urinary incontinence, bowel incontinence, significant weight loss, significant motor weakness, and loss of sensations.    Pain Disability Index Review:         3/19/2025    11:12 AM 1/23/2020     1:35 PM   Last 3 PDI Scores   Pain Disability Index (PDI) 28 60       Non-Pharmacologic Treatments:  Physical Therapy/Home Exercise: yes  Ice/Heat:yes  TENS: no  Acupuncture: no  Massage: yes  Chiropractic: no    Other: no      Pain Medications:  - Opioids:  Tramadol  - Adjuvant Medications:  Lyrica, alprazolam, Fioricet, Cymbalta, Plaquenil, Mobic, Seroquel, Zanaflex  - Anti-Coagulants: Aspirin     report:  Reviewed and consistent with medication use as prescribed.    Pain Procedures:   -01/31/2025: Bilateral L3-4 TFESI with 75% relief for  the 1st week and decreased relief following for about 30% overall      Imaging:   X-ray lumbar spine 01/02/2025:   There is no compression fracture. There is grade 1 spondylolisthesis with 3 mm anterolisthesis L3 on L4, new compared prior study. The alignment is otherwise well-preserved. Again, status post L4-5 anterior and posterior spinal fusion with well-positioned hardware without evidence of loosening or failure. There is a L3-4 degenerative disease with disc space narrowing and mild spurring, mildly progressive disc space narrowing compared to prior study. The remaining disc spaces are well-preserved. There is multilevel facet arthropathy including L2-3, L3-4, and L5-S1.     MRI lumbar spine 12/17/2024:  Alignment is unremarkable.  Changes of L4-5 posterior and anterior fusion.  No evidence of aggressive osseous lesion or acute compression deformity.  Vertebral body heights are maintained.  Multilevel disc height loss, most pronounced at L3-4 where it is moderate to severe.  With exception of mild postcontrast enhancement about the below described disc protrusions/extrusions, there is no additional evidence of abnormal focal enhancement. Conus medullaris tip is at L1-2.  Bunching of the cauda equina nerve roots at the level of L2 due to below described spinal canal stenosis.     T12-L1: No significant spinal canal stenosis or neuroforaminal narrowing.     L1-2: No significant spinal canal stenosis or neuroforaminal narrowing.     L2-3: Small to moderate concentric disc bulge superimposed left paracentral disc protrusion/extrusion.  Moderate spinal canal stenosis.  Mild left neuroforaminal narrowing.     L3-4: Moderate concentric disc bulge with superimposed left paracentral disc extrusion with cephalad migration measuring 2.0 cm in CC dimension.  Severe spinal canal stenosis measuring 6 mm in AP dimension.  Moderate to severe bilateral neural foraminal narrowing.  Moderate bilateral facet arthropathy and  ligamentum flavum thickening.      L4-5: Instrumented level.  Small concentric disc bulge flattens the ventral thecal sac.  No significant spinal canal stenosis.  Mild bilateral neuroforaminal narrowing.  Moderate bilateral facet arthropathy and ligamentum flavum thickening.     L5-S1: Trace concentric disc bulge with no significant spinal canal stenosis.  Mild bilateral neuroforaminal narrowing.  Moderate to severe bilateral facet arthropathy and ligamentum flavum thickening..     Paravertebral soft tissues are unremarkable.      Past Medical History:   Diagnosis Date    Allergy     Anxiety     Asthma     Bipolar disorder     Chronic low back pain     Depression     Deviated septum     Disorder of kidney and ureter     Diverticulitis     DJD (degenerative joint disease)     Encounter for blood transfusion     GERD (gastroesophageal reflux disease)     Hyperlipidemia     Hypothyroidism     Neck pain     Osteoarthritis      Past Surgical History:   Procedure Laterality Date    ABCESS DRAINAGE      CATARACT EXTRACTION      DILATION AND CURETTAGE OF UTERUS      EXTRACTION OF TOOTH      EYE SURGERY  01/2006    Cataract Surgery Both Eyes Not Done On Same Day    HIP SURGERY      HYSTERECTOMY      JOINT REPLACEMENT  12/22/2015 & 11/03/2016    Right Hip first then Left Hip    LUMBAR DISC SURGERY      NASAL SEPTOPLASTY      OPEN RESECTION OF LUNG      SELECTIVE INJECTION OF ANESTHETIC AGENT AROUND LUMBAR SPINAL NERVE ROOT BY TRANSFORAMINAL APPROACH Bilateral 1/31/2025    Procedure: Bilateral L3-4 TF JAYLIN;  Surgeon: Jaydon Gamboa MD;  Location: [facility];  Service: Pain Management;  Laterality: Bilateral;    SINUS SURGERY      SPINE SURGERY  08/2012    Lower Back Surgery    turbinectomy       Social History[1]  Family History   Problem Relation Name Age of Onset    Charcot-Bonny-Tooth disease Mother Olga Boydraad Carter     Alzheimer's disease Mother Olga Lilliana Carter     Arthritis Mother Olgayoselin Tijerina  Gilmerhi Carter         Charcot-Bonny-Tooth,    Lung cancer Father Murali Carter     Pancreatic cancer Father Murali Carter     Liver cancer Father Murali Carter     Bone cancer Father Murali Carter     Cancer Father Murali Carter         Pancreatic Cancer, Liver Cancer, Bone Cancer, Brain Cancer, Passed Away    Cancer Sister Pushpa Chisholm         Endometrial Cancer, Lung Cancer, Passed away    Endometrial cancer Sister Pushpa Chisholm     Lung cancer Sister Pushpa Chisholm     Diabetes Sister Pushpa Chisholm     Heart disease Brother Ace Carter         Heart Disease, Organ Failure ... Has passed away       Review of patient's allergies indicates:   Allergen Reactions    Codeine Hives    Skelaxin [metaxalone]     Tylenol-codeine #4 [acetaminophen-codeine] Rash       Current Outpatient Medications   Medication Sig    albuterol (PROVENTIL/VENTOLIN HFA) 90 mcg/actuation inhaler TAKE 2 PUFFS EVERY 6 HOURS AS NEEDED FOR WHEEZE    albuterol-ipratropium (DUO-NEB) 2.5 mg-0.5 mg/3 mL nebulizer solution Take 3 mLs by nebulization every 6 (six) hours as needed for Wheezing. Rescue    ALPRAZolam (XANAX) 0.5 MG tablet Take 1 tablet (0.5 mg total) by mouth 2 (two) times daily.    aspirin 81 MG Chew Take 81 mg by mouth once daily.    azelastine (ASTELIN) 137 mcg (0.1 %) nasal spray INSTILL 2 SPRAYS IN EACH NOSTRIL DAILY    butalbital-acetaminophen-caffeine -40 mg (FIORICET, ESGIC) -40 mg per tablet Take 1 tablet by mouth daily as needed for Headaches.    DULoxetine (CYMBALTA) 60 MG capsule Take 1 capsule (60 mg total) by mouth once daily.    fluticasone-umeclidin-vilanter (TRELEGY ELLIPTA) 200-62.5-25 mcg inhaler Inhale 1 puff into the lungs once daily.    hydroxychloroquine (PLAQUENIL) 200 mg tablet Take 1 tablet (200 mg total) by mouth 2 (two) times daily.    hydrOXYzine HCL (ATARAX) 25 MG tablet TAKE 1 TABLET BY MOUTH  THREE TIMES A DAY    levothyroxine (SYNTHROID) 50 MCG tablet Take 1 tablet (50 mcg total) by mouth once daily.    meloxicam (MOBIC) 15 MG tablet TAKE 1 TABLET BY MOUTH EVERY DAY AS NEEDED FOR PAIN    montelukast (SINGULAIR) 10 mg tablet TAKE 1 TABLET BY MOUTH EVERY EVENING    omeprazole (PRILOSEC) 40 MG capsule Take 1 capsule (40 mg total) by mouth once daily.    pravastatin (PRAVACHOL) 20 MG tablet Take 1 tablet (20 mg total) by mouth once daily.    QUEtiapine (SEROQUEL XR) 400 MG Tb24 Take 1 tablet (400 mg total) by mouth once daily.    tiZANidine (ZANAFLEX) 4 MG tablet TAKE 1 OR 2 TABLETS BY MOUTH AT BEDTIME AS NEEDED FOR BACK PAIN    VRAYLAR 3 mg Cap TAKE 1 CAPSULE BY MOUTH DAILY.    pregabalin (LYRICA) 100 MG capsule Take 1 capsule (100 mg total) by mouth 2 (two) times daily.    traMADoL (ULTRAM) 50 mg tablet Take 1 tablet (50 mg total) by mouth every 4 (four) hours as needed for Pain. (Patient not taking: Reported on 3/19/2025)    traMADoL (ULTRAM) 50 mg tablet Take 1 tablet (50 mg total) by mouth every 4 (four) hours as needed for Pain. (Patient not taking: Reported on 3/19/2025)     No current facility-administered medications for this visit.       Review of Systems     GENERAL:  No weight loss, malaise or fevers.  HEENT:   No recent changes in vision or hearing  NECK:  Negative for lumps, no difficulty with swallowing.  RESPIRATORY:  Negative for cough, wheezing or shortness of breath, patient denies any recent URI.  CARDIOVASCULAR:  Negative for chest pain, leg swelling or palpitations.  GI:  Negative for abdominal discomfort, blood in stools or black stools or change in bowel habits.  MUSCULOSKELETAL:  See HPI.  SKIN:  Negative for lesions, rash, and itching.  PSYCH:  No mood disorder or recent psychosocial stressors.  Patients sleep is not disturbed secondary to pain.  HEMATOLOGY/LYMPHOLOGY:  Negative for prolonged bleeding, bruising easily or swollen nodes.  Patient is currently taking  "anti-coagulants  NEURO:   No history of headaches, syncope, paralysis, seizures or tremors.  All other reviewed and negative other than HPI.    OBJECTIVE:    BP (!) 167/82   Pulse 81   Ht 5' 2" (1.575 m)   Wt 83.6 kg (184 lb 4.9 oz)   BMI 33.71 kg/m²         Physical Exam    GENERAL: Well appearing, in no acute distress, alert and oriented x3.  PSYCH:  Mood and affect appropriate.  SKIN: Skin color, texture, turgor normal, no rashes or lesions.  HEAD/FACE:  Normocephalic, atraumatic. Cranial nerves grossly intact.   CV: RRR with palpation of the radial artery.  PULM: No evidence of respiratory difficulty, symmetric chest rise.  GI:  Soft and non-tender.  BACK: Straight leg raising in the sitting and supine positions is equivocal to radicular pain.  Mild-to-moderate pain to palpation over the facet joints of the lumbar spine and lumbar paraspinals.  Limited range of motion secondary to pain reproduction. Directional preference:  Pain with both flexion-extension  EXTREMITIES: No deformities, edema, or skin discoloration. Good capillary refill.  MUSCULOSKELETAL:  Hip and knee provocative maneuvers are unremarkable.  There is no pain with palpation over the sacroiliac joints bilaterally.  FABERs test is negative.  FADIRs test is negative.   Bilateral upper and lower extremity strength is normal and symmetric.  No atrophy or tone abnormalities are noted.  NEURO: Bilateral upper and lower extremity coordination and muscle stretch reflexes are physiologic and symmetric.  Plantar response are downgoing. No clonus.  No loss of sensation is noted.  GAIT:  Slow, antalgic.      LABS:  Lab Results   Component Value Date    WBC 6.00 06/20/2024    HGB 14.5 06/20/2024    HCT 43.9 06/20/2024    MCV 88 06/20/2024     06/20/2024       CMP  Sodium   Date Value Ref Range Status   06/20/2024 139 136 - 145 mmol/L Final     Potassium   Date Value Ref Range Status   06/20/2024 4.3 3.5 - 5.1 mmol/L Final     Chloride   Date Value " Ref Range Status   06/20/2024 105 95 - 110 mmol/L Final     CO2   Date Value Ref Range Status   06/20/2024 23 23 - 29 mmol/L Final     Glucose   Date Value Ref Range Status   06/20/2024 96 70 - 110 mg/dL Final     BUN   Date Value Ref Range Status   06/20/2024 12 8 - 23 mg/dL Final     Creatinine   Date Value Ref Range Status   06/20/2024 0.8 0.5 - 1.4 mg/dL Final     Calcium   Date Value Ref Range Status   06/20/2024 10.1 8.7 - 10.5 mg/dL Final     Total Protein   Date Value Ref Range Status   06/20/2024 7.8 6.0 - 8.4 g/dL Final     Albumin   Date Value Ref Range Status   06/20/2024 4.2 3.5 - 5.2 g/dL Final     Total Bilirubin   Date Value Ref Range Status   06/20/2024 0.2 0.1 - 1.0 mg/dL Final     Comment:     For infants and newborns, interpretation of results should be based  on gestational age, weight and in agreement with clinical  observations.    Premature Infant recommended reference ranges:  Up to 24 hours.............<8.0 mg/dL  Up to 48 hours............<12.0 mg/dL  3-5 days..................<15.0 mg/dL  6-29 days.................<15.0 mg/dL       Alkaline Phosphatase   Date Value Ref Range Status   06/20/2024 151 (H) 55 - 135 U/L Final     AST   Date Value Ref Range Status   06/20/2024 26 10 - 40 U/L Final     ALT   Date Value Ref Range Status   06/20/2024 24 10 - 44 U/L Final     Anion Gap   Date Value Ref Range Status   06/20/2024 11 8 - 16 mmol/L Final     eGFR    Date Value Ref Range Status   05/11/2021 134 >=60 mL/min/1.73mSq Final     eGFR if non    Date Value Ref Range Status   03/12/2021 88 >59 mL/min/1.73 Final       Lab Results   Component Value Date    HGBA1C 5.3 06/13/2024             ASSESSMENT: 64 y.o. year old female with lower back pain, consistent with     1. Lumbar radiculopathy  Case Request-RAD/Other Procedure Area: Bilateral L3-4 TF JAYLIN      2. Chronic bilateral low back pain with bilateral sciatica  Ambulatory referral/consult to Pain Clinic    Case  Request-RAD/Other Procedure Area: Bilateral L3-4 TF JAYLIN      3. History of lumbar fusion  pregabalin (LYRICA) 100 MG capsule    Case Request-RAD/Other Procedure Area: Bilateral L3-4 TF JAYLIN            PLAN:   - Interventions:  Scheduled for repeat bilateral L3-4 TFESI as she received greater than 50% relief of her back pain for the 1st week, but pain started to return near baseline. Explained the risks and benefits of the procedure in detail with the patient today in clinic along with alternative treatment options, and the patient elected to pursue the intervention at this time.      - Anticoagulation use: Patient is taking ASA as 1° prevention; she will have to stop 5 days prior to procedure.  No need for clearance as this is self prescribed      - If no benefit with above procedure, consider  surgical decompression at L3-4 .     - Medications: I have stressed the importance of physical activity and a home exercise plan to help with pain and improve health. and Patient can continue with medications for now since they are providing benefits, using them appropriately, and without side effects.     We will increase Lyrica to 100 mg b.i.d. from 50 mg t.i.d.        - Therapy:  Advised patient continue with activities as tolerated    - Psychological:  Discussed coping mechanisms to help address chronic pain issues    - Labs:  Reviewed    - Imaging: Reviewed available imaging with patient and answered any questions they had regarding study.    - Consults/Referrals:  None at this time    - Records:  Reviewed/Obtain old records from outside physicians and imaging    - Follow up visit: return to clinic in 4-6 weeks post procedure or as needed    - Counseled patient regarding the importance of activity modification and physical therapy    - This condition does not require this patient to take time off of work, and the primary goal of our Pain Management services is to improve the patient's functional capacity.    - Patient  Questions: Answered all of the patient's questions regarding diagnosis, therapy, and treatment        The above plan and management options were discussed at length with patient. Patient is in agreement with the above and verbalized understanding.    I discussed the goals of interventional chronic pain management with the patient on today's visit.  I explained the utility of injections for diagnostic and therapeutic purposes.  We discussed a multimodal approach to pain including treating the patient's given worst pain at any given time.  We will use a systematic approach to addressing pain.  We will also adopt a multimodal approach that includes injections, adjuvant medications, physical therapy, at times psychiatry.  There may be a limited role for opioid use intermittently in the treatment of pain, more particularly for acute pain although no one approach can be used as a sole treatment modality.    I emphasized the importance of regular exercise, core strengthening and stretching, diet and weight loss as a cornerstone of long-term pain management.    Visit today included increased complexity associated with the care of the episodic problem of chronic pain which was addressed and continue to manage the longitudinal care of the patient due to the serious and/or complex managed problem(s) listed above.      Dirk Alexis MD  Interventional Pain Management  [facility]    Disclaimer:  This note was prepared using voice recognition system and is likely to have sound alike errors that may have been overlooked even after proof reading.  Please call me with any questions         [1]   Social History  Socioeconomic History    Marital status:     Number of children: 2   Tobacco Use    Smoking status: Former     Current packs/day: 0.00     Average packs/day: 0.3 packs/day for 1 year (0.3 ttl pk-yrs)     Types: Cigars, Cigarettes, Vaping with nicotine     Quit date: 2024     Years since quittin.8     Smokeless tobacco: Never    Tobacco comments:     Smoke 2 or 3 Black & Mild Cigars as day.   Substance and Sexual Activity    Alcohol use: Not Currently     Alcohol/week: 29.0 standard drinks of alcohol     Types: 1 Glasses of wine, 28 Shots of liquor per week     Comment: none 2-3 weeks    Drug use: Never    Sexual activity: Yes     Partners: Male     Birth control/protection: See Surgical Hx     Comment: Complete Hysterectomy in 2006     Social Drivers of Health     Financial Resource Strain: Low Risk  (10/11/2024)    Overall Financial Resource Strain (CARDIA)     Difficulty of Paying Living Expenses: Not hard at all   Food Insecurity: No Food Insecurity (10/11/2024)    Hunger Vital Sign     Worried About Running Out of Food in the Last Year: Never true     Ran Out of Food in the Last Year: Never true   Physical Activity: Insufficiently Active (10/11/2024)    Exercise Vital Sign     Days of Exercise per Week: 2 days     Minutes of Exercise per Session: 10 min   Stress: No Stress Concern Present (10/11/2024)    [employer] - Occupational Stress Questionnaire     Feeling of Stress : Only a little   Housing Stability: Unknown (10/11/2024)    Housing Stability Vital Sign     Unable to Pay for Housing in the Last Year: No

## 2025-03-20 ENCOUNTER — TELEPHONE (OUTPATIENT)
Dept: PAIN MEDICINE | Facility: CLINIC | Age: 65
End: 2025-03-20
Payer: COMMERCIAL

## 2025-03-20 NOTE — TELEPHONE ENCOUNTER
----- Message from [employer] sent at 3/20/2025  9:06 AM CDT -----  .Type: Patient Call BackWho called:patient What is the request in detail:  calling concerning medication, pregabalin (LYRICA) 100 MG capsule that was prescribed. patient stated she was advised she will be prescribed 50 mg and was wondering if prescription needs to be fixed in system  Can the clinic reply by [facility]?   Would the patient rather a call back or a response via [facility] call back number:  .[phone]

## 2025-03-20 NOTE — TELEPHONE ENCOUNTER
Per Dr. Alexis clinic encounter note dated 3/19/25:    We will increase Lyrica to 100 mg b.i.d. from 50 mg t.i.d.     Spoke to Pharmacist to verify rx as well per pt's request.     Pt notified. All questions answered.//lp

## 2025-03-25 DIAGNOSIS — Z51.81 MEDICATION MONITORING ENCOUNTER: ICD-10-CM

## 2025-03-25 DIAGNOSIS — M15.0 PRIMARY OSTEOARTHRITIS INVOLVING MULTIPLE JOINTS: Primary | ICD-10-CM

## 2025-03-26 ENCOUNTER — LAB VISIT (OUTPATIENT)
Dept: LAB | Facility: HOSPITAL | Age: 65
End: 2025-03-26
Attending: INTERNAL MEDICINE
Payer: COMMERCIAL

## 2025-03-26 DIAGNOSIS — Z51.81 MEDICATION MONITORING ENCOUNTER: ICD-10-CM

## 2025-03-26 DIAGNOSIS — M15.0 PRIMARY OSTEOARTHRITIS INVOLVING MULTIPLE JOINTS: ICD-10-CM

## 2025-03-26 LAB
ALBUMIN SERPL BCP-MCNC: 3.4 G/DL (ref 3.5–5.2)
ALP SERPL-CCNC: 112 UNIT/L (ref 40–150)
ALT SERPL W/O P-5'-P-CCNC: 18 UNIT/L (ref 10–44)
ANION GAP (OHS): 13 MMOL/L (ref 8–16)
AST SERPL-CCNC: 31 UNIT/L (ref 11–45)
BILIRUB SERPL-MCNC: 0.3 MG/DL (ref 0.1–1)
BUN SERPL-MCNC: 10 MG/DL (ref 8–23)
CALCIUM SERPL-MCNC: 9 MG/DL (ref 8.7–10.5)
CHLORIDE SERPL-SCNC: 104 MMOL/L (ref 95–110)
CO2 SERPL-SCNC: 22 MMOL/L (ref 23–29)
CREAT SERPL-MCNC: 0.8 MG/DL (ref 0.5–1.4)
CRP SERPL-MCNC: 1.2 MG/L
ERYTHROCYTE [SEDIMENTATION RATE] IN BLOOD: 6 MM/HR
GFR SERPLBLD CREATININE-BSD FMLA CKD-EPI: >60 ML/MIN/1.73/M2
GLUCOSE SERPL-MCNC: 111 MG/DL (ref 70–110)
POTASSIUM SERPL-SCNC: 4.4 MMOL/L (ref 3.5–5.1)
PROT SERPL-MCNC: 6.1 GM/DL (ref 6–8.4)
SODIUM SERPL-SCNC: 139 MMOL/L (ref 136–145)
URATE SERPL-MCNC: 3.9 MG/DL (ref 2.4–5.7)

## 2025-03-26 PROCEDURE — 84550 ASSAY OF BLOOD/URIC ACID: CPT

## 2025-03-26 PROCEDURE — 36415 COLL VENOUS BLD VENIPUNCTURE: CPT | Mod: PN

## 2025-03-26 PROCEDURE — 86140 C-REACTIVE PROTEIN: CPT

## 2025-03-26 PROCEDURE — 85652 RBC SED RATE AUTOMATED: CPT

## 2025-03-26 PROCEDURE — 80053 COMPREHEN METABOLIC PANEL: CPT

## 2025-04-03 ENCOUNTER — TELEPHONE (OUTPATIENT)
Dept: RHEUMATOLOGY | Facility: CLINIC | Age: 65
End: 2025-04-03
Payer: COMMERCIAL
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
## 2025-04-04 ENCOUNTER — PATIENT MESSAGE (OUTPATIENT)
Dept: PAIN MEDICINE | Facility: HOSPITAL | Age: 65
End: 2025-04-04
Payer: COMMERCIAL

## 2025-04-04 NOTE — PRE-PROCEDURE INSTRUCTIONS
Spoke with patient regarding procedure scheduled on 4.8     Arrival time 1215     Has patient been sick with fever or on antibiotics within the last 7 days? No     Does the patient have any open wounds, sores or rashes? No     Does the patient have any recent fractures? no     Has patient received a vaccination within the last 7 days? No     Received the COVID vaccination? yes     Has the patient stopped all medications as directed? hold asa 5 days prior to procedure. Self prescribed.     Does patient have a pacemaker, defibrillator, or implantable stimulator? No     Does the patient have a ride to and from procedure and someone reliable to remain with patient?        Is the patient diabetic? no      Does the patient have sleep apnea? Or use O2 at home? no     Is the patient receiving sedation? Yes      Is the patient instructed to remain NPO beginning at midnight the night before their procedure? yes     Procedure location confirmed with patient? Yes     Covid- Denies signs/symptoms. Instructed to notify PAT/MD if any changes.

## 2025-04-07 ENCOUNTER — TELEPHONE (OUTPATIENT)
Dept: PAIN MEDICINE | Facility: CLINIC | Age: 65
End: 2025-04-07
Payer: COMMERCIAL

## 2025-04-07 RX ORDER — ONDANSETRON HYDROCHLORIDE 2 MG/ML
4 INJECTION, SOLUTION INTRAVENOUS ONCE AS NEEDED
OUTPATIENT
Start: 2025-04-07 | End: 2036-09-03

## 2025-04-07 NOTE — TELEPHONE ENCOUNTER
----- Message from Renee sent at 4/7/2025 11:57 AM CDT -----  Contact: [phone] - Eb (Spouse)  .1MEDICALADVICE Patient is calling for Medical Advice regarding:Good Morning,Patient is calling to confirm time she need to be in for visit How long has patient had these symptoms:Pharmacy name and phone#:Patient wants a call back or thru [facility]:call Comments:Patient would like us to call spouse Please advise patient replies from provider may take up to 48 hours.

## 2025-04-07 NOTE — TELEPHONE ENCOUNTER
I called the patient and gave her Ms. Hull number to call for her procedure time.    Anjana LOPEZ ([facility])

## 2025-04-08 ENCOUNTER — HOSPITAL ENCOUNTER (OUTPATIENT)
Facility: HOSPITAL | Age: 65
Discharge: HOME | End: 2025-04-08
Attending: PHYSICAL MEDICINE & REHABILITATION | Admitting: PHYSICAL MEDICINE & REHABILITATION
Payer: COMMERCIAL

## 2025-04-08 VITALS
BODY MASS INDEX: 37.47 KG/M2 | DIASTOLIC BLOOD PRESSURE: 59 MMHG | HEIGHT: 62 IN | SYSTOLIC BLOOD PRESSURE: 133 MMHG | TEMPERATURE: 98 F | OXYGEN SATURATION: 96 % | HEART RATE: 82 BPM | WEIGHT: 203.63 LBS | RESPIRATION RATE: 17 BRPM

## 2025-04-08 DIAGNOSIS — M54.16 LUMBAR RADICULOPATHY: Primary | ICD-10-CM

## 2025-04-08 PROCEDURE — 64483 NJX AA&/STRD TFRM EPI L/S 1: CPT | Mod: 50,,, | Performed by: PHYSICAL MEDICINE & REHABILITATION

## 2025-04-08 PROCEDURE — 63600175 PHARM REV CODE 636 W HCPCS: Performed by: PHYSICAL MEDICINE & REHABILITATION

## 2025-04-08 PROCEDURE — 64483 NJX AA&/STRD TFRM EPI L/S 1: CPT | Mod: 50 | Performed by: PHYSICAL MEDICINE & REHABILITATION

## 2025-04-08 PROCEDURE — 25500020 PHARM REV CODE 255: Performed by: PHYSICAL MEDICINE & REHABILITATION

## 2025-04-08 RX ORDER — FENTANYL CITRATE 50 UG/ML
INJECTION, SOLUTION INTRAMUSCULAR; INTRAVENOUS
Status: DISCONTINUED | OUTPATIENT
Start: 2025-04-08 | End: 2025-04-08 | Stop reason: HOSPADM

## 2025-04-08 RX ORDER — MIDAZOLAM HYDROCHLORIDE 1 MG/ML
INJECTION, SOLUTION INTRAMUSCULAR; INTRAVENOUS
Status: DISCONTINUED | OUTPATIENT
Start: 2025-04-08 | End: 2025-04-08 | Stop reason: HOSPADM

## 2025-04-08 RX ORDER — METHYLPREDNISOLONE ACETATE 40 MG/ML
INJECTION, SUSPENSION INTRA-ARTICULAR; INTRALESIONAL; INTRAMUSCULAR; SOFT TISSUE
Status: DISCONTINUED | OUTPATIENT
Start: 2025-04-08 | End: 2025-04-08 | Stop reason: HOSPADM

## 2025-04-08 RX ORDER — BUPIVACAINE HYDROCHLORIDE 2.5 MG/ML
INJECTION, SOLUTION EPIDURAL; INFILTRATION; INTRACAUDAL; PERINEURAL
Status: DISCONTINUED | OUTPATIENT
Start: 2025-04-08 | End: 2025-04-08 | Stop reason: HOSPADM

## 2025-04-08 NOTE — DISCHARGE SUMMARY
Discharge Note  Short Stay      SUMMARY     Admit Date: 4/8/2025    Attending Physician: Dirk Alexis MD        Discharge Physician: Dirk Alexis MD        Discharge Date: 4/8/2025 2:44 PM    Procedure(s) (LRB):  Bilateral L3-4 TF JAYLIN (Bilateral)    Final Diagnosis: Chronic bilateral low back pain with bilateral sciatica [M54.42, M54.41, G89.29]  History of lumbar fusion [Z98.1]  Lumbar radiculopathy [M54.16]    Disposition: Home or self care    Patient Instructions:   Current Discharge Medication List        CONTINUE these medications which have NOT CHANGED    Details   albuterol (PROVENTIL/VENTOLIN HFA) 90 mcg/actuation inhaler TAKE 2 PUFFS EVERY 6 HOURS AS NEEDED FOR WHEEZE  Qty: 18 g, Refills: 2    Associated Diagnoses: Moderate persistent asthma without complication      albuterol-ipratropium (DUO-NEB) 2.5 mg-0.5 mg/3 mL nebulizer solution Take 3 mLs by nebulization every 6 (six) hours as needed for Wheezing. Rescue  Qty: 30 each, Refills: 0    Associated Diagnoses: Moderate persistent asthma without complication      ALPRAZolam (XANAX) 0.5 MG tablet Take 1 tablet (0.5 mg total) by mouth 2 (two) times daily.  Qty: 60 tablet, Refills: 2    Associated Diagnoses: Bipolar disorder, current episode mixed, moderate      butalbital-acetaminophen-caffeine -40 mg (FIORICET, ESGIC) -40 mg per tablet Take 1 tablet by mouth daily as needed for Headaches.  Qty: 30 tablet, Refills: 2    Associated Diagnoses: Episodic tension-type headache, not intractable      DULoxetine (CYMBALTA) 60 MG capsule Take 1 capsule (60 mg total) by mouth once daily.  Qty: 90 capsule, Refills: 1    Associated Diagnoses: Bipolar disorder, current episode mixed, moderate      fluticasone-umeclidin-vilanter (TRELEGY ELLIPTA) 200-62.5-25 mcg inhaler Inhale 1 puff into the lungs once daily.  Qty: 28 each, Refills: 5    Associated Diagnoses: Moderate persistent asthma with acute exacerbation      hydroxychloroquine (PLAQUENIL) 200  mg tablet Take 1 tablet (200 mg total) by mouth 2 (two) times daily.  Qty: 180 tablet, Refills: 1    Associated Diagnoses: Erosive osteoarthritis      hydrOXYzine HCL (ATARAX) 25 MG tablet TAKE 1 TABLET BY MOUTH THREE TIMES A DAY  Qty: 60 tablet, Refills: 2    Associated Diagnoses: Bipolar disorder, current episode mixed, moderate      levothyroxine (SYNTHROID) 50 MCG tablet Take 1 tablet (50 mcg total) by mouth once daily.  Qty: 90 tablet, Refills: 1    Associated Diagnoses: Acquired hypothyroidism      meloxicam (MOBIC) 15 MG tablet TAKE 1 TABLET BY MOUTH EVERY DAY AS NEEDED FOR PAIN  Qty: 90 tablet, Refills: 1    Associated Diagnoses: Rheumatoid arthritis of multiple sites with negative rheumatoid factor      montelukast (SINGULAIR) 10 mg tablet TAKE 1 TABLET BY MOUTH EVERY EVENING  Qty: 90 tablet, Refills: 1    Associated Diagnoses: Moderate persistent asthma without complication      omeprazole (PRILOSEC) 40 MG capsule Take 1 capsule (40 mg total) by mouth once daily.  Qty: 90 capsule, Refills: 1    Associated Diagnoses: Gastroesophageal reflux disease without esophagitis      pravastatin (PRAVACHOL) 20 MG tablet Take 1 tablet (20 mg total) by mouth once daily.  Qty: 90 tablet, Refills: 1    Associated Diagnoses: Mixed hyperlipidemia      pregabalin (LYRICA) 100 MG capsule Take 1 capsule (100 mg total) by mouth 2 (two) times daily.  Qty: 60 capsule, Refills: 2    Associated Diagnoses: History of lumbar fusion      QUEtiapine (SEROQUEL XR) 400 MG Tb24 Take 1 tablet (400 mg total) by mouth once daily.  Qty: 90 tablet, Refills: 1    Associated Diagnoses: Bipolar disorder, current episode mixed, moderate      tiZANidine (ZANAFLEX) 4 MG tablet TAKE 1 OR 2 TABLETS BY MOUTH AT BEDTIME AS NEEDED FOR BACK PAIN  Qty: 60 tablet, Refills: 0    Associated Diagnoses: Rheumatoid arthritis of multiple sites with negative rheumatoid factor      !! traMADoL (ULTRAM) 50 mg tablet Take 1 tablet (50 mg total) by mouth every 4  (four) hours as needed for Pain.  Qty: 28 tablet, Refills: 0    Comments: Quantity prescribed more than 7 day supply? No  Associated Diagnoses: History of lumbar fusion      !! traMADoL (ULTRAM) 50 mg tablet Take 1 tablet (50 mg total) by mouth every 4 (four) hours as needed for Pain.  Qty: 30 tablet, Refills: 0    Comments: Quantity prescribed more than 7 day supply? No  Associated Diagnoses: Chronic bilateral low back pain with bilateral sciatica      VRAYLAR 3 mg Cap TAKE 1 CAPSULE BY MOUTH DAILY.  Qty: 90 capsule, Refills: 1    Associated Diagnoses: Bipolar disorder, current episode mixed, moderate      aspirin 81 MG Chew Take 81 mg by mouth once daily.      azelastine (ASTELIN) 137 mcg (0.1 %) nasal spray INSTILL 2 SPRAYS IN EACH NOSTRIL DAILY  Qty: 30 mL, Refills: 5    Associated Diagnoses: Seasonal allergic rhinitis due to other allergic trigger       !! - Potential duplicate medications found. Please discuss with provider.              Discharge Diagnosis: Chronic bilateral low back pain with bilateral sciatica [M54.42, M54.41, G89.29]  History of lumbar fusion [Z98.1]  Lumbar radiculopathy [M54.16]  Condition on Discharge: Stable with no complications to procedure   Diet on Discharge: Same as before.  Activity: as per instruction sheet.  Discharge to: Home with a responsible adult.  Follow up: 2-4 weeks       Please call the office at [phone] if you experience any weakness or loss of sensation, fever > 101.5, pain uncontrolled with oral medications, persistent nausea/vomiting/or diarrhea, redness or drainage from the incisions, or any other worrisome concerns. If physician on call was not reached or could not communicate with our office for any reason please go to the nearest emergency department

## 2025-04-08 NOTE — H&P
"HPI  Patient presenting for Procedure(s) (LRB):  Bilateral L3-4 TF JAYLIN (Bilateral)       No health changes since previous encounter    Past Medical History:   Diagnosis Date    Allergy     Anxiety     Asthma     Bipolar disorder     Chronic low back pain     Depression     Deviated septum     Disorder of kidney and ureter     Diverticulitis     DJD (degenerative joint disease)     Encounter for blood transfusion     GERD (gastroesophageal reflux disease)     Hyperlipidemia     Hypothyroidism     Neck pain     Osteoarthritis      Past Surgical History:   Procedure Laterality Date    ABCESS DRAINAGE      CATARACT EXTRACTION      DILATION AND CURETTAGE OF UTERUS      EXTRACTION OF TOOTH      EYE SURGERY  01/2006    Cataract Surgery Both Eyes Not Done On Same Day    HIP SURGERY      HYSTERECTOMY      JOINT REPLACEMENT  12/22/2015 & 11/03/2016    Right Hip first then Left Hip    LUMBAR DISC SURGERY      NASAL SEPTOPLASTY      OPEN RESECTION OF LUNG      SELECTIVE INJECTION OF ANESTHETIC AGENT AROUND LUMBAR SPINAL NERVE ROOT BY TRANSFORAMINAL APPROACH Bilateral 1/31/2025    Procedure: Bilateral L3-4 TF JAYLIN;  Surgeon: Jaydon Gamboa MD;  Location: [facility];  Service: Pain Management;  Laterality: Bilateral;    SINUS SURGERY      SPINE SURGERY  08/2012    Lower Back Surgery    turbinectomy       Review of patient's allergies indicates:   Allergen Reactions    Codeine Hives    Skelaxin [metaxalone]     Tylenol-codeine #4 [acetaminophen-codeine] Rash        Medications Ordered Prior to Encounter[1]     PMHx, PSHx, Allergies, Medications reviewed in epic    ROS negative except pain complaints in HPI    OBJECTIVE:    BP (!) 160/74 (Patient Position: Sitting)   Pulse 79   Temp 97.2 °F (36.2 °C) (Axillary)   Resp 18   Ht 5' 2" (1.575 m)   Wt 92.4 kg (203 lb 9.5 oz) Comment: Pt states weight gain d/t fluid build up, MD apt tomorrow  SpO2 98%   BMI 37.24 kg/m²     PHYSICAL EXAMINATION:    GENERAL: Well appearing, in no " acute distress, alert and oriented x3.  PSYCH:  Mood and affect appropriate.  SKIN: Skin color, texture, turgor normal, no rashes or lesions which will impact the procedure.  CV: RRR with palpation of the radial artery.  PULM: No evidence of respiratory difficulty, symmetric chest rise. Clear to auscultation.  NEURO: Cranial nerves grossly intact.    Plan:    Proceed with procedure as planned Procedure(s) (LRB):  Bilateral L3-4 TF JAYLIN (Bilateral)    Dirk Alexis MD  04/08/2025                 [1]   No current facility-administered medications on file prior to encounter.     Current Outpatient Medications on File Prior to Encounter   Medication Sig Dispense Refill    albuterol (PROVENTIL/VENTOLIN HFA) 90 mcg/actuation inhaler TAKE 2 PUFFS EVERY 6 HOURS AS NEEDED FOR WHEEZE 18 g 2    albuterol-ipratropium (DUO-NEB) 2.5 mg-0.5 mg/3 mL nebulizer solution Take 3 mLs by nebulization every 6 (six) hours as needed for Wheezing. Rescue 30 each 0    ALPRAZolam (XANAX) 0.5 MG tablet Take 1 tablet (0.5 mg total) by mouth 2 (two) times daily. 60 tablet 2    butalbital-acetaminophen-caffeine -40 mg (FIORICET, ESGIC) -40 mg per tablet Take 1 tablet by mouth daily as needed for Headaches. 30 tablet 2    DULoxetine (CYMBALTA) 60 MG capsule Take 1 capsule (60 mg total) by mouth once daily. 90 capsule 1    fluticasone-umeclidin-vilanter (TRELEGY ELLIPTA) 200-62.5-25 mcg inhaler Inhale 1 puff into the lungs once daily. 28 each 5    hydroxychloroquine (PLAQUENIL) 200 mg tablet Take 1 tablet (200 mg total) by mouth 2 (two) times daily. 180 tablet 1    levothyroxine (SYNTHROID) 50 MCG tablet Take 1 tablet (50 mcg total) by mouth once daily. 90 tablet 1    meloxicam (MOBIC) 15 MG tablet TAKE 1 TABLET BY MOUTH EVERY DAY AS NEEDED FOR PAIN 90 tablet 1    montelukast (SINGULAIR) 10 mg tablet TAKE 1 TABLET BY MOUTH EVERY EVENING 90 tablet 1    omeprazole (PRILOSEC) 40 MG capsule Take 1 capsule (40 mg total) by mouth once  daily. 90 capsule 1    pravastatin (PRAVACHOL) 20 MG tablet Take 1 tablet (20 mg total) by mouth once daily. 90 tablet 1    pregabalin (LYRICA) 100 MG capsule Take 1 capsule (100 mg total) by mouth 2 (two) times daily. 60 capsule 2    QUEtiapine (SEROQUEL XR) 400 MG Tb24 Take 1 tablet (400 mg total) by mouth once daily. 90 tablet 1    traMADoL (ULTRAM) 50 mg tablet Take 1 tablet (50 mg total) by mouth every 4 (four) hours as needed for Pain. 28 tablet 0    traMADoL (ULTRAM) 50 mg tablet Take 1 tablet (50 mg total) by mouth every 4 (four) hours as needed for Pain. 30 tablet 0    VRAYLAR 3 mg Cap TAKE 1 CAPSULE BY MOUTH DAILY. 90 capsule 1    aspirin 81 MG Chew Take 81 mg by mouth once daily.      azelastine (ASTELIN) 137 mcg (0.1 %) nasal spray INSTILL 2 SPRAYS IN EACH NOSTRIL DAILY 30 mL 5    [DISCONTINUED] hydrOXYzine HCL (ATARAX) 25 MG tablet TAKE 1 TABLET BY MOUTH THREE TIMES A DAY 60 tablet 2

## 2025-04-08 NOTE — OP NOTE
INFORMED CONSENT: The procedure, risks, benefits and options were discussed with patient. There are no contraindications to the procedure. The patient expressed understanding and agreed to proceed. The personnel performing the procedure was discussed.    04/08/2025    Surgeon: Dirk Alexis MD    Assistants: None    SEDATION: Conscious sedation provided by M.D    The patient was monitored with continuous pulse oximetry, EKG, and intermittent blood pressure monitors, immediately prior to administration of sedation.  The patient was hemodynamically stable throughout the entire process was responsive to voice, and breathing spontaneously.  Supplemental O2 was provided at 2L/min via nasal cannula.  Patient was comfortable for the duration of the procedure.     There was a total of 2mg IV Midazolam and 50mcg Fentanyl titrated for the procedure    Total sedation time was >10minutes and <20minutes      PROCEDURE:  Bilateral  L3/4  1) Left  L3/4 TRANSFORAMINAL EPIDURAL STEROID INJECTION  2) Right  L3/4 TRANSFORAMINAL EPIDURAL STEROID INJECTION      Pre Procedure diagnosis:  Bilateral L3/4 Chronic bilateral low back pain with bilateral sciatica [M54.42, M54.41, G89.29]  History of lumbar fusion [Z98.1]  Lumbar radiculopathy [M54.16]    Post-Procedure diagnosis:   same    Complications: None    Specimens: None      DESCRIPTION OF PROCEDURE: The patient was brought to the procedure room. IV access was obtained prior to the procedure. The patient was positioned prone on the fluoroscopy table. Continuous hemodynamic monitoring was initiated including blood pressure, EKG, and pulse oximetry. . The skin was prepped with chlorhexidine and draped in a sterile fashion. Skin anesthesia was achieved using a total of 10mL of lidocaine, 5mL over each respective injection site.     The  L3/4 transforaminal spaces were identified with fluoroscopy in the  AP, oblique, and lateral views.  A 22 gauge spinal quinke needle was then advanced  into the area of the trans foraminal spaces bilaterally with confirmation of proper needle position using AP, oblique, and lateral fluoroscopic views. Once the needle tip was in the area of the transforaminal space, and there was no blood, CSF or paraesthesias,  1.5 mL of Omnipaque 300mg/ml was injected on each side for a total of 3mL.  Fluoroscopic imaging in the AP and lateral views revealed a clear outline of the spinal nerve with proximal spread of agent through the neural foramen into the epidural space. A total combination of 1 mL of Bupivicaine 0.25% and 40 mg depo medrol was injected on each side for a total of 4mL of injected medications with displacement of the contrast dye confirming that the medication went into the area of the transforaminal spaces bilaterally. A sterile dressing was applied.   Patient tolerated the procedure well.    Patient was taken back to the recovery room for further observation.     The patient was discharged to home in stable condition

## 2025-04-08 NOTE — DISCHARGE INSTRUCTIONS

## 2025-06-17 ENCOUNTER — TELEPHONE (OUTPATIENT)
Dept: PAIN MEDICINE | Facility: CLINIC | Age: 65
End: 2025-06-17
Payer: COMMERCIAL

## 2025-06-18 ENCOUNTER — OFFICE VISIT (OUTPATIENT)
Dept: PAIN MEDICINE | Facility: CLINIC | Age: 65
End: 2025-06-18
Payer: COMMERCIAL

## 2025-06-18 VITALS — HEIGHT: 62 IN | BODY MASS INDEX: 35.41 KG/M2

## 2025-06-18 DIAGNOSIS — M54.16 LUMBAR RADICULOPATHY: ICD-10-CM

## 2025-06-18 DIAGNOSIS — Z98.1 HISTORY OF LUMBAR FUSION: Primary | ICD-10-CM

## 2025-06-18 DIAGNOSIS — M47.816 LUMBAR SPONDYLOSIS: ICD-10-CM

## 2025-06-18 PROCEDURE — 98006 SYNCH AUDIO-VIDEO EST MOD 30: CPT | Mod: 95,,, | Performed by: PHYSICIAN ASSISTANT

## 2025-06-18 PROCEDURE — G2211 COMPLEX E/M VISIT ADD ON: HCPCS | Mod: 95,,, | Performed by: PHYSICIAN ASSISTANT

## 2025-06-18 PROCEDURE — 3044F HG A1C LEVEL LT 7.0%: CPT | Mod: CPTII,95,, | Performed by: PHYSICIAN ASSISTANT

## 2025-06-18 RX ORDER — PREGABALIN 100 MG/1
100 CAPSULE ORAL 2 TIMES DAILY
Qty: 180 CAPSULE | Refills: 1 | Status: SHIPPED | OUTPATIENT
Start: 2025-06-18 | End: 2025-09-16

## 2025-06-18 NOTE — PROGRESS NOTES
Established Patient- Interventional Pain Management - Telemedicine Visit    The patient location is: [place]  The chief complaint leading to consultation is: medication refill, injection follow up    Visit type: audiovisual    Face to Face time with patient: 15-20 minutes of total time spent on the encounter, which includes face to face time and non-face to face time preparing to see the patient (eg, review of tests), Obtaining and/or reviewing separately obtained history, Documenting clinical information in the electronic or other health record, Independently interpreting results (not separately reported) and communicating results to the patient/family/caregiver, or Care coordination (not separately reported).         Each patient to whom he or she provides medical services by telemedicine is:  (1) informed of the relationship between the physician and patient and the respective role of any other health care provider with respect to management of the patient; and (2) notified that he or she may decline to receive medical services by telemedicine and may withdraw from such care at any time.    Notes:         Referring Physician: No ref. provider found    PCP: Jose Taylor FNP    Chief Complaint:   Chief Complaint   Patient presents with    Follow-up        SUBJECTIVE:    Interval History (6/18/2025):  Kelly Gilmore presents today for follow-up visit.  she underwent Bilateral L3/4  TF JAYLIN on 4/8/25.  The patient reports that she is/was better following the procedure.  she reports 40% pain relief.   Patient reports pain as 6/10 today.Localizes pain to lower back and buttocks.  Patient reports great relief with Lyrica 100 mg . Takes BID. Requests refills on this medication today. Does not report unpleasant side effects with the medication.    Initial HPI (3/19/2025):  Kelly iGlmore is a 64 y.o. female who presents to the clinic for the evaluation of lower back and leg pain.  She was referred by Neurosurgery for  further evaluation and management of this pain.  She has past medical history of lumbar radiculopathy, bipolar disorder, anxiety, depression, asthma, hyperlipidemia, CKD stage 3, rheumatoid arthritis, hypothyroidism, GERD, and multiple other medical comorbidities as listed in her chart.  The pain started several years ago and symptoms have been worsening.The pain is located in the lumbosacral area and radiates to the bilateral hips.  The pain is described as sharp, stabbing, aching and is rated as 8/10. The pain is rated with a score of  2/10 on the BEST day and a score of 10/10 on the WORST day.  Symptoms interfere with daily activity. The pain is exacerbated by lifting, bending, twisting.  The pain is mitigated by gentle movement and medication    Patient denies night fever/night sweats, urinary incontinence, bowel incontinence, significant weight loss, significant motor weakness, and loss of sensations.    Pain Disability Index Review:         3/19/2025    11:12 AM 1/23/2020     1:35 PM   Last 3 PDI Scores   Pain Disability Index (PDI) 28 60       Non-Pharmacologic Treatments:  Physical Therapy/Home Exercise: yes  Ice/Heat:yes  TENS: no  Acupuncture: no  Massage: yes  Chiropractic: no    Other: no      Pain Medications:  - Opioids:  Tramadol  - Adjuvant Medications:  Lyrica, alprazolam, Fioricet, Cymbalta, Plaquenil, Mobic, Seroquel, Zanaflex  - Anti-Coagulants: Aspirin     report:  Reviewed and consistent with medication use as prescribed.    Pain Procedures:   -01/31/2025: Bilateral L3-4 TFESI with 75% relief for the 1st week and decreased relief following for about 30% overall  -04/08/2025: Bilateral L3/4 TF JAYLIN with 40% relief    Imaging:   X-ray lumbar spine 01/02/2025:   There is no compression fracture. There is grade 1 spondylolisthesis with 3 mm anterolisthesis L3 on L4, new compared prior study. The alignment is otherwise well-preserved. Again, status post L4-5 anterior and posterior spinal fusion  with well-positioned hardware without evidence of loosening or failure. There is a L3-4 degenerative disease with disc space narrowing and mild spurring, mildly progressive disc space narrowing compared to prior study. The remaining disc spaces are well-preserved. There is multilevel facet arthropathy including L2-3, L3-4, and L5-S1.     MRI lumbar spine 12/17/2024:  Alignment is unremarkable.  Changes of L4-5 posterior and anterior fusion.  No evidence of aggressive osseous lesion or acute compression deformity.  Vertebral body heights are maintained.  Multilevel disc height loss, most pronounced at L3-4 where it is moderate to severe.  With exception of mild postcontrast enhancement about the below described disc protrusions/extrusions, there is no additional evidence of abnormal focal enhancement. Conus medullaris tip is at L1-2.  Bunching of the cauda equina nerve roots at the level of L2 due to below described spinal canal stenosis.     T12-L1: No significant spinal canal stenosis or neuroforaminal narrowing.     L1-2: No significant spinal canal stenosis or neuroforaminal narrowing.     L2-3: Small to moderate concentric disc bulge superimposed left paracentral disc protrusion/extrusion.  Moderate spinal canal stenosis.  Mild left neuroforaminal narrowing.     L3-4: Moderate concentric disc bulge with superimposed left paracentral disc extrusion with cephalad migration measuring 2.0 cm in CC dimension.  Severe spinal canal stenosis measuring 6 mm in AP dimension.  Moderate to severe bilateral neural foraminal narrowing.  Moderate bilateral facet arthropathy and ligamentum flavum thickening.      L4-5: Instrumented level.  Small concentric disc bulge flattens the ventral thecal sac.  No significant spinal canal stenosis.  Mild bilateral neuroforaminal narrowing.  Moderate bilateral facet arthropathy and ligamentum flavum thickening.     L5-S1: Trace concentric disc bulge with no significant spinal canal  stenosis.  Mild bilateral neuroforaminal narrowing.  Moderate to severe bilateral facet arthropathy and ligamentum flavum thickening..     Paravertebral soft tissues are unremarkable.      Past Medical History:   Diagnosis Date    Allergy     Anxiety     Asthma     Bipolar disorder     Chronic low back pain     Depression     Deviated septum     Disorder of kidney and ureter     Diverticulitis     DJD (degenerative joint disease)     Encounter for blood transfusion     GERD (gastroesophageal reflux disease)     Hyperlipidemia     Hypothyroidism     Neck pain     Osteoarthritis      Past Surgical History:   Procedure Laterality Date    ABCESS DRAINAGE      CATARACT EXTRACTION      DILATION AND CURETTAGE OF UTERUS      EXTRACTION OF TOOTH      EYE SURGERY  01/2006    Cataract Surgery Both Eyes Not Done On Same Day    HIP SURGERY      HYSTERECTOMY      JOINT REPLACEMENT  12/22/2015 & 11/03/2016    Right Hip first then Left Hip    LUMBAR DISC SURGERY      NASAL SEPTOPLASTY      OPEN RESECTION OF LUNG      SELECTIVE INJECTION OF ANESTHETIC AGENT AROUND LUMBAR SPINAL NERVE ROOT BY TRANSFORAMINAL APPROACH Bilateral 1/31/2025    Procedure: Bilateral L3-4 TF JAYLIN;  Surgeon: Jaydon Gamboa MD;  Location: [facility];  Service: Pain Management;  Laterality: Bilateral;    SELECTIVE INJECTION OF ANESTHETIC AGENT AROUND LUMBAR SPINAL NERVE ROOT BY TRANSFORAMINAL APPROACH Bilateral 4/8/2025    Procedure: Bilateral L3-4 TF JAYLIN;  Surgeon: Dirk Alexis MD;  Location: [facility];  Service: Pain Management;  Laterality: Bilateral;    SINUS SURGERY      SPINE SURGERY  08/2012    Lower Back Surgery    turbinectomy       Social History[1]  Family History   Problem Relation Name Age of Onset    Charcot-Bonny-Tooth disease Mother Olga Lilliana Carter     Alzheimer's disease Mother Olga Lilliana Carter     Arthritis Mother Olga Lilliana Carter         Charcot-Bonny-Tooth,    Lung cancer Father Murali Parker Raul      Pancreatic cancer Father Murali Carter     Liver cancer Father Murali Carter     Bone cancer Father Murali Carter     Cancer Father Murali Carter         Pancreatic Cancer, Liver Cancer, Bone Cancer, Brain Cancer, Passed Away    Cancer Sister Pushpa Chisholm         Endometrial Cancer, Lung Cancer, Passed away    Endometrial cancer Sister Pushpa Chisholm     Lung cancer Sister Pushpa Chisholm     Diabetes Sister Pushpa Chisholm     Heart disease Brother Ace Carter         Heart Disease, Organ Failure ... Has passed away       Review of patient's allergies indicates:   Allergen Reactions    Codeine Hives    Skelaxin [metaxalone]     Tylenol-codeine #4 [acetaminophen-codeine] Rash       Current Outpatient Medications   Medication Sig    albuterol (PROVENTIL/VENTOLIN HFA) 90 mcg/actuation inhaler TAKE 2 PUFFS EVERY 6 HOURS AS NEEDED FOR WHEEZE    albuterol-ipratropium (DUO-NEB) 2.5 mg-0.5 mg/3 mL nebulizer solution Take 3 mLs by nebulization every 6 (six) hours as needed for Wheezing. Rescue    ALPRAZolam (XANAX) 0.5 MG tablet Take 1 tablet (0.5 mg total) by mouth 2 (two) times daily.    aspirin 81 MG Chew Take 81 mg by mouth once daily.    azelastine (ASTELIN) 137 mcg (0.1 %) nasal spray INSTILL 2 SPRAYS IN EACH NOSTRIL DAILY    butalbital-acetaminophen-caffeine -40 mg (FIORICET, ESGIC) -40 mg per tablet Take 1 tablet by mouth daily as needed for Headaches.    cariprazine (VRAYLAR) 3 mg Cap Take 1 capsule (3 mg total) by mouth once daily.    diethylpropion (TENUATE) 75 mg SR tablet Take 1 tablet (75 mg total) by mouth every morning.    DULoxetine (CYMBALTA) 60 MG capsule Take 1 capsule (60 mg total) by mouth once daily.    furosemide (LASIX) 20 MG tablet Take 1 tablet (20 mg total) by mouth once daily.    hydroxychloroquine (PLAQUENIL) 200 mg tablet Take 1 tablet (200 mg total) by mouth 2 (two) times daily.     hydrOXYzine HCL (ATARAX) 25 MG tablet TAKE 1 TABLET BY MOUTH THREE TIMES A DAY    levothyroxine (SYNTHROID) 50 MCG tablet Take 1 tablet (50 mcg total) by mouth once daily.    meloxicam (MOBIC) 15 MG tablet Take 1 tablet (15 mg total) by mouth daily as needed for Pain.    montelukast (SINGULAIR) 10 mg tablet Take 1 tablet (10 mg total) by mouth every evening.    omeprazole (PRILOSEC) 40 MG capsule Take 1 capsule (40 mg total) by mouth once daily.    potassium chloride SA (K-DUR,KLOR-CON M) 10 MEQ tablet TAKE 1 TABLET (10 MEQ TOTAL) BY MOUTH ONCE DAILY. WITH LASIX    pravastatin (PRAVACHOL) 20 MG tablet Take 1 tablet (20 mg total) by mouth once daily.    pregabalin (LYRICA) 100 MG capsule Take 1 capsule (100 mg total) by mouth 2 (two) times daily.    QUEtiapine (SEROQUEL XR) 400 MG Tb24 Take 1 tablet (400 mg total) by mouth once daily.    tiZANidine (ZANAFLEX) 4 MG tablet TAKE 1 OR 2 TABLETS BY MOUTH AT BEDTIME AS NEEDED FOR BACK PAIN    traMADoL (ULTRAM) 50 mg tablet Take 1 tablet (50 mg total) by mouth every 4 (four) hours as needed for Pain.    TRELEGY ELLIPTA 200-62.5-25 mcg inhaler INHALE 1 PUFF INTO THE LUNGS ONCE DAILY.     No current facility-administered medications for this visit.       Review of Systems     GENERAL:  No weight loss, malaise or fevers.  HEENT:   No recent changes in vision or hearing  NECK:  Negative for lumps, no difficulty with swallowing.  RESPIRATORY:  Negative for cough, wheezing or shortness of breath, patient denies any recent URI.  CARDIOVASCULAR:  Negative for chest pain, leg swelling or palpitations.  GI:  Negative for abdominal discomfort, blood in stools or black stools or change in bowel habits.  MUSCULOSKELETAL:  See HPI.  SKIN:  Negative for lesions, rash, and itching.  PSYCH:  No mood disorder or recent psychosocial stressors.  Patients sleep is not disturbed secondary to pain.  HEMATOLOGY/LYMPHOLOGY:  Negative for prolonged bleeding, bruising easily or swollen nodes.   "Patient is currently taking anti-coagulants  NEURO:   No history of headaches, syncope, paralysis, seizures or tremors.  All other reviewed and negative other than HPI.    OBJECTIVE:  Telemedicine Physical Exam:   Vitals:    06/18/25 0905   Height: 5' 2" (1.575 m)     Body mass index is 35.41 kg/m².   (reviewed on 6/18/2025)     (Physical exam performed virtually with patient guided on specific actions and diagnostic maneuvers)  GENERAL: Well appearing, in no acute distress, alert and oriented x3.  Cooperative.  PSYCH:  Mood and affect appropriate.  SKIN: Skin color & texture with no obvious abnormalities.    HEAD/FACE:  Normocephalic, atraumatic.    PULM:  No difficulty breathing. No nasal flaring. No obvious wheezing.  EXTREMITIES: No obvious deformities. Moving all extremities well, appears to have symmetric strength throughout.  MUSCULOSKELETAL: No obvious atrophy abnormalities are noted.   NEURO: No obvious neurologic deficit.   GAIT: sitting.     Physical Exam: last in clinic visit:        Physical Exam    GENERAL: Well appearing, in no acute distress, alert and oriented x3.  PSYCH:  Mood and affect appropriate.  SKIN: Skin color, texture, turgor normal, no rashes or lesions.  HEAD/FACE:  Normocephalic, atraumatic. Cranial nerves grossly intact.   CV: RRR with palpation of the radial artery.  PULM: No evidence of respiratory difficulty, symmetric chest rise.  GI:  Soft and non-tender.  BACK: Straight leg raising in the sitting and supine positions is equivocal to radicular pain.  Mild-to-moderate pain to palpation over the facet joints of the lumbar spine and lumbar paraspinals.  Limited range of motion secondary to pain reproduction. Directional preference:  Pain with both flexion-extension  EXTREMITIES: No deformities, edema, or skin discoloration. Good capillary refill.  MUSCULOSKELETAL:  Hip and knee provocative maneuvers are unremarkable.  There is no pain with palpation over the sacroiliac joints " bilaterally.  FABERs test is negative.  FADIRs test is negative.   Bilateral upper and lower extremity strength is normal and symmetric.  No atrophy or tone abnormalities are noted.  NEURO: Bilateral upper and lower extremity coordination and muscle stretch reflexes are physiologic and symmetric.  Plantar response are downgoing. No clonus.  No loss of sensation is noted.  GAIT:  Slow, antalgic.      LABS:  Lab Results   Component Value Date    WBC 5.0 04/09/2025    HGB 10.3 (L) 04/09/2025    HCT 31.0 (L) 04/09/2025    MCV 82 04/09/2025     04/09/2025       CMP  Sodium   Date Value Ref Range Status   04/09/2025 141 134 - 144 mmol/L Final     Potassium   Date Value Ref Range Status   04/09/2025 4.3 3.5 - 5.2 mmol/L Final     Chloride   Date Value Ref Range Status   04/09/2025 105 96 - 106 mmol/L Final     CO2   Date Value Ref Range Status   04/09/2025 22 20 - 29 mmol/L Final     Glucose   Date Value Ref Range Status   04/09/2025 81 70 - 99 mg/dL Final     BUN   Date Value Ref Range Status   04/09/2025 15 8 - 27 mg/dL Final   03/26/2025 10 8 - 23 mg/dL Final     Creatinine   Date Value Ref Range Status   04/09/2025 0.82 0.57 - 1.00 mg/dL Final   03/26/2025 0.8 0.5 - 1.4 mg/dL Final     Calcium   Date Value Ref Range Status   04/09/2025 9.3 8.7 - 10.3 mg/dL Final     Protein Total   Date Value Ref Range Status   03/26/2025 6.1 6.0 - 8.4 gm/dL Final     Total Protein   Date Value Ref Range Status   06/20/2024 7.8 6.0 - 8.4 g/dL Final     Albumin   Date Value Ref Range Status   04/09/2025 3.7 (L) 3.9 - 4.9 g/dL Final   03/26/2025 3.4 (L) 3.5 - 5.2 g/dL Final   06/20/2024 4.2 3.5 - 5.2 g/dL Final     Total Bilirubin   Date Value Ref Range Status   04/09/2025 0.2 0.0 - 1.2 mg/dL Final     Alkaline Phosphatase   Date Value Ref Range Status   06/20/2024 151 (H) 55 - 135 U/L Final     ALP   Date Value Ref Range Status   03/26/2025 112 40 - 150 unit/L Final     AST   Date Value Ref Range Status   04/09/2025 27 0 - 40  IU/L Final     ALT   Date Value Ref Range Status   04/09/2025 23 0 - 32 IU/L Final     Anion Gap   Date Value Ref Range Status   03/26/2025 13 8 - 16 mmol/L Final     eGFR    Date Value Ref Range Status   05/11/2021 134 >=60 mL/min/1.73mSq Final     eGFR if non    Date Value Ref Range Status   03/12/2021 88 >59 mL/min/1.73 Final       Lab Results   Component Value Date    HGBA1C 5.1 04/09/2025       ASSESSMENT: 64 y.o. year old female with lower back pain, consistent with     1. History of lumbar fusion  pregabalin (LYRICA) 100 MG capsule      2. Lumbar radiculopathy  pregabalin (LYRICA) 100 MG capsule      3. Lumbar spondylosis  pregabalin (LYRICA) 100 MG capsule        PLAN:   - Interventions: None at this time.      - S/p repeat bilateral L3-4 TFESI on 4/8/25 with 40% relief    - Anticoagulation use: Patient is taking ASA as 1° prevention.    - If no benefit with above procedure, consider surgical decompression at L3-4.     - Medications: I have stressed the importance of physical activity and a home exercise plan to help with pain and improve health. and Patient can continue with medications for now since they are providing benefits, using them appropriately, and without side effects.     - Continue Lyrica 100 mg b.i.d. 90 day supply +1 refill provided.     report:  Reviewed and consistent with medication use as prescribed.        - Therapy:  Advised patient continue with activities as tolerated    - Psychological:  Discussed coping mechanisms to help address chronic pain issues    - Labs:  Reviewed    - Imaging: Reviewed available imaging with patient and answered any questions they had regarding study.    - Consults/Referrals:  Follow up with Neurosurgery, scheduled to see Dr. Ahmadi on 6/19/25      - Records:  Reviewed/Obtain old records from outside physicians and imaging    - Follow up visit: return to clinic in 4-6 months as needed    - Counseled patient regarding the  importance of activity modification and physical therapy    - This condition does not require this patient to take time off of work, and the primary goal of our Pain Management services is to improve the patient's functional capacity.    - Patient Questions: Answered all of the patient's questions regarding diagnosis, therapy, and treatment        The above plan and management options were discussed at length with patient. Patient is in agreement with the above and verbalized understanding.    I discussed the goals of interventional chronic pain management with the patient on today's visit.  I explained the utility of injections for diagnostic and therapeutic purposes.  We discussed a multimodal approach to pain including treating the patient's given worst pain at any given time.  We will use a systematic approach to addressing pain.  We will also adopt a multimodal approach that includes injections, adjuvant medications, physical therapy, at times psychiatry.  There may be a limited role for opioid use intermittently in the treatment of pain, more particularly for acute pain although no one approach can be used as a sole treatment modality.    I emphasized the importance of regular exercise, core strengthening and stretching, diet and weight loss as a cornerstone of long-term pain management.    Visit today included increased complexity associated with the care of the episodic problem of chronic pain which was addressed and continue to manage the longitudinal care of the patient due to the serious and/or complex managed problem(s) listed above.      Paola Anton PA-C  Interventional Pain Management  [facility]    Disclaimer:  This note was prepared using voice recognition system and is likely to have sound alike errors that may have been overlooked even after proof reading.  Please call me with any questions                               [1]   Social History  Socioeconomic History    Marital status:      Number of children: 2   Tobacco Use    Smoking status: Former     Current packs/day: 0.00     Average packs/day: 0.3 packs/day for 1 year (0.3 ttl pk-yrs)     Types: Cigars, Cigarettes, Vaping with nicotine     Quit date: 2024     Years since quittin.1    Smokeless tobacco: Never    Tobacco comments:     Smoke 2 or 3 Black & Mild Cigars as day.   Substance and Sexual Activity    Alcohol use: Not Currently     Alcohol/week: 29.0 standard drinks of alcohol     Types: 1 Glasses of wine, 28 Shots of liquor per week     Comment: none 2-3 weeks    Drug use: Never    Sexual activity: Yes     Partners: Male     Birth control/protection: See Surgical Hx     Comment: Complete Hysterectomy in 2006     Social Drivers of Health     Financial Resource Strain: Low Risk  (2025)    Overall Financial Resource Strain (CARDIA)     Difficulty of Paying Living Expenses: Not hard at all   Food Insecurity: No Food Insecurity (2025)    Hunger Vital Sign     Worried About Running Out of Food in the Last Year: Never true     Ran Out of Food in the Last Year: Never true   Transportation Needs: No Transportation Needs (2025)    PRAPARE - Transportation     Lack of Transportation (Medical): No     Lack of Transportation (Non-Medical): No   Physical Activity: Insufficiently Active (2025)    Exercise Vital Sign     Days of Exercise per Week: 2 days     Minutes of Exercise per Session: 10 min   Stress: No Stress Concern Present (2025)    [employer] [employer] - Occupational Stress Questionnaire     Feeling of Stress : Only a little   Housing Stability: Low Risk  (2025)    Housing Stability Vital Sign     Unable to Pay for Housing in the Last Year: No     Number of Times Moved in the Last Year: 0     Homeless in the Last Year: No

## 2025-06-19 ENCOUNTER — HOSPITAL ENCOUNTER (OUTPATIENT)
Dept: RADIOLOGY | Facility: HOSPITAL | Age: 65
Discharge: HOME | End: 2025-06-19
Attending: PHYSICIAN ASSISTANT
Payer: COMMERCIAL

## 2025-06-19 ENCOUNTER — OFFICE VISIT (OUTPATIENT)
Dept: NEUROSURGERY | Facility: CLINIC | Age: 65
End: 2025-06-19
Attending: PHYSICIAN ASSISTANT
Payer: COMMERCIAL

## 2025-06-19 VITALS
BODY MASS INDEX: 35.62 KG/M2 | WEIGHT: 193.56 LBS | HEIGHT: 62 IN | DIASTOLIC BLOOD PRESSURE: 87 MMHG | HEART RATE: 66 BPM | SYSTOLIC BLOOD PRESSURE: 139 MMHG

## 2025-06-19 DIAGNOSIS — M41.50 SCOLIOSIS DUE TO DEGENERATIVE DISEASE OF SPINE IN ADULT PATIENT: ICD-10-CM

## 2025-06-19 DIAGNOSIS — T14.8XXA MUSCULOLIGAMENTOUS STRAIN: ICD-10-CM

## 2025-06-19 DIAGNOSIS — M54.16 LUMBAR RADICULOPATHY: ICD-10-CM

## 2025-06-19 DIAGNOSIS — M54.10 BACK PAIN WITH RADICULOPATHY: Primary | ICD-10-CM

## 2025-06-19 DIAGNOSIS — M54.9 DORSALGIA, UNSPECIFIED: ICD-10-CM

## 2025-06-19 DIAGNOSIS — M48.062 LUMBAR STENOSIS WITH NEUROGENIC CLAUDICATION: ICD-10-CM

## 2025-06-19 PROCEDURE — 99999 PR PBB SHADOW E&M-EST. PATIENT-LVL III: CPT | Mod: PBBFAC,,, | Performed by: NEUROLOGICAL SURGERY

## 2025-06-19 PROCEDURE — 99214 OFFICE O/P EST MOD 30 MIN: CPT | Mod: S$GLB,,, | Performed by: NEUROLOGICAL SURGERY

## 2025-06-19 PROCEDURE — 3079F DIAST BP 80-89 MM HG: CPT | Mod: CPTII,S$GLB,, | Performed by: NEUROLOGICAL SURGERY

## 2025-06-19 PROCEDURE — 3008F BODY MASS INDEX DOCD: CPT | Mod: CPTII,S$GLB,, | Performed by: NEUROLOGICAL SURGERY

## 2025-06-19 PROCEDURE — 72082 X-RAY EXAM ENTIRE SPI 2/3 VW: CPT | Mod: 26,,, | Performed by: RADIOLOGY

## 2025-06-19 PROCEDURE — 72131 CT LUMBAR SPINE W/O DYE: CPT | Mod: 26,,, | Performed by: RADIOLOGY

## 2025-06-19 PROCEDURE — 72082 X-RAY EXAM ENTIRE SPI 2/3 VW: CPT | Mod: TC

## 2025-06-19 PROCEDURE — 72131 CT LUMBAR SPINE W/O DYE: CPT | Mod: TC

## 2025-06-19 PROCEDURE — 3044F HG A1C LEVEL LT 7.0%: CPT | Mod: CPTII,S$GLB,, | Performed by: NEUROLOGICAL SURGERY

## 2025-06-19 PROCEDURE — 3075F SYST BP GE 130 - 139MM HG: CPT | Mod: CPTII,S$GLB,, | Performed by: NEUROLOGICAL SURGERY

## 2025-06-19 RX ORDER — TRAMADOL HYDROCHLORIDE 50 MG/1
50 TABLET, FILM COATED ORAL EVERY 8 HOURS PRN
Qty: 60 TABLET | Refills: 0 | Status: SHIPPED | OUTPATIENT
Start: 2025-06-19

## 2025-06-19 NOTE — PROGRESS NOTES
"Subjective:      Patient ID: Kelly Gilmore is a 64 y.o. female.    Chief Complaint: Dorsalgia, unspecified (Pt reports for f/u of lumbar spine pain; Pt reports back pain is constant and radiates to jatinder le; Pt reports jatinder le )    Patient is here for follow up of chronic low back pain with MRI & Xrays Lumbar for review.   Hx of lumbar fusion in [place] about 15yrs ago L4-5 combined anterior/posterior .   States pain has increased since previous visit 4mo ago. Pain across mid back in to Jatinder thighs. Ambulates with cane but presents to office in wheelchair due to feeling sore into her legs  Seeing an orthopedic for her knee pain and is suppose to get a R TKA.  Last JAYLIN in April, did not find relief with this.   Pain = 10/10  Meds = Mobic, Lyrica, Tizanidine, Cymbalta.               PREVIOUS HPI:  BL Leg pain. Legs give out on her. Pain.   Patient trying to lose weight to help with her pain. She continues to walk using RW. Having significant pain and overall debility, unable to perform cleaning etc...   Patient weight fluctuates.      PREVIOUS HPI:     Patient is 65 yo F who presents to clinic for evaluation of low back pain. She had previous Lumbar spinal fusion for scoliosis. States this was about 15 years ago with a Surgeon in [place]. Patient reports pain in low back which shoots down her buttocks and into BLLE. States she has some "catching" has had multiple falls. She has been ambulating with a cane. Patient initially improved after surgery, and slowly over the last few months has deteriorated. She reports pain shooting down intter thigh.                                    Objective:       Physical Exam:  Nursing note and vitals reviewed.    Constitutional: She appears well-nourished. She is not diaphoretic. No distress.     Eyes: Pupils are equal, round, and reactive to light. EOM are normal.     Cardiovascular: Normal rate and regular rhythm.     Psych/Behavior: She is alert. She is oriented to person, place, and " time. She has a normal mood and affect.     Musculoskeletal:        Back: Range of motion is limited. There is tenderness. Muscle strength is 5/5.       Right Lower Extremities: Range of motion is full. There is no tenderness. Muscle strength is 5/5. Tone is normal.        Left Lower Extremities: There is no tenderness. Muscle strength is 5/5. Tone is normal.     Neurological:        Sensory: There is no sensory deficit in the trunk. There is no sensory deficit in the extremities.        Cranial nerves: Cranial nerve(s) II, III, IV, V, VI, VII, VIII, IX, X, XI and XII are intact.     General    Nursing note and vitals reviewed.  Constitutional: She is oriented to person, place, and time. She appears well-nourished. No distress.   Eyes: EOM are normal. Pupils are equal, round, and reactive to light.   Cardiovascular:  Normal rate and regular rhythm.            Neurological: She is alert and oriented to person, place, and time.   Psychiatric: She has a normal mood and affect.             Ortho Exam        No results found for this or any previous visit.     Results for orders placed during the hospital encounter of 01/23/20    X-Ray Lumbar Complete With Flex And Ext    Narrative  EXAMINATION:  XR LUMBAR SPINE 5 VIEW WITH FLEX AND EXT    CLINICAL HISTORY:  Low back pain, >6wks conservative tx, persistent-progressive sx, surgical candidate;  Sacroiliitis, not elsewhere classified    TECHNIQUE:  Five views of the lumbar spine plus flexion extension views were performed.    COMPARISON:  None.    FINDINGS:  Anterior and posterior spinal fusion hardware is seen spanning L5-S1 levels with disc insert.  No hardware complication is identified.  No instability is demonstrated on flexion/extension maneuvers.  Mild multilevel degenerative changes with spurring of the endplates and mild facet arthropathy.  Vertebral body heights and disc spaces are preserved.  SI joints are intact.    Impression  As above      Electronically  signed by: Rell Brooks MD  Date:    01/23/2020  Time:    15:22    Results for orders placed during the hospital encounter of 01/02/25    X-Ray Lumbar Spine AP And Lateral    Narrative  EXAM: XR LUMBAR SPINE AP AND LATERAL    CLINICAL HISTORY:  Arthrodesis status.    TECHNIQUE: Lumbar spine x-ray 3 views.    COMPARISON STUDY: 01/23/2020.    FINDINGS:  There is no compression fracture.  There is grade 1 spondylolisthesis with 3 mm anterolisthesis L3 on L4, new compared prior study.  The alignment is otherwise well-preserved.  Again, status post L4-5 anterior and posterior spinal fusion with well-positioned hardware without evidence of loosening or failure.  There is a L3-4 degenerative disease with disc space narrowing and mild spurring, mildly progressive disc space narrowing compared to prior study.  The remaining disc spaces are well-preserved.  There is multilevel facet arthropathy including L2-3, L3-4, and L5-S1.    Impression  See discussion above.    Finalized on: 1/2/2025 10:18 AM By:  Eusebio Pandey MD  [facility]# [id number]      2025-01-02 10:20:12.829    [facility]   .  Assessment:     1. Back pain with radiculopathy    2. Lumbar radiculopathy    3. Scoliosis due to degenerative disease of spine in adult patient    4. Musculoligamentous strain    5. Lumbar stenosis with neurogenic claudication      Plan:     Back pain with radiculopathy  -     traMADoL (ULTRAM) 50 mg tablet; Take 1 tablet (50 mg total) by mouth every 8 (eight) hours as needed for Pain.  Dispense: 60 tablet; Refill: 0    Lumbar radiculopathy    Scoliosis due to degenerative disease of spine in adult patient    Musculoligamentous strain    Lumbar stenosis with neurogenic claudication      Patient with multiple orthopedic issues that is being currently managed by ortho and neuro spine   History of anterior and posterior lumbar fusion L4-5  as well as bilateral hip replacement and previous knee surgery  she needs a knee replacement however shows currently  holding on this pending financial concern  Has severe stenosis at L3-4 as well as L2-3 where there is adjacent segment disease causing severe stenosis as well as bilateral foraminal narrowing  She has positive sagittal balance and loss of lordosis to the lumbar spine  Discussed that any possible spinal procedure would involve multilevel lumbar decompression with interbody fusion likely T10 pelvis versus L2 to pelvis based on repeat standing x-rays  This is would involve postoperative rehabilitation and recovery ongoing for up to a year .   She would need DEXA scan to measure bone density as well as to repeat standing AP and lateral x-rays  She is unsure that she was doing her Standing x-rays full upright    We will provide them with information regarding the surgery and if   she wishes to consider surgery    I discussed if symptoms worsen or has bowel or bladder issues please contact us sooner       Thank you for the referral   Please call with any questions    Josue Ahmadi MD  Neurosurgery     Disclaimer: This note was prepared using a voice recognition system and is likely to have sound alike errors within the text.

## 2025-06-20 NOTE — PROGRESS NOTES
"Subjective:      Patient ID: Kelly Gilmore is a 64 y.o. female.    Chief Complaint: Dorsalgia, unspecified (Pt reports for f/u of lumbar spine pain; Pt reports back pain is constant and radiates to jatinder le; Pt reports jatinder le )    Patient is here for follow up of chronic low back pain with MRI & Xrays Lumbar for review.   Hx of lumbar fusion in [place] about 15yrs ago L4-5 combined anterior/posterior .   States pain has increased since previous visit 4mo ago. Pain across mid back in to Jatinder thighs. Ambulates with cane but presents to office in wheelchair due to feeling sore into her legs  Seeing an orthopedic for her knee pain and is suppose to get a R TKA.  Last JAYLIN in April, did not find relief with this.   Pain = 10/10  Meds = Mobic, Lyrica, Tizanidine, Cymbalta.               PREVIOUS HPI:  BL Leg pain. Legs give out on her. Pain.   Patient trying to lose weight to help with her pain. She continues to walk using RW. Having significant pain and overall debility, unable to perform cleaning etc...   Patient weight fluctuates.      PREVIOUS HPI:     Patient is 63 yo F who presents to clinic for evaluation of low back pain. She had previous Lumbar spinal fusion for scoliosis. States this was about 15 years ago with a Surgeon in [place]. Patient reports pain in low back which shoots down her buttocks and into BLLE. States she has some "catching" has had multiple falls. She has been ambulating with a cane. Patient initially improved after surgery, and slowly over the last few months has deteriorated. She reports pain shooting down intter thigh.                                    Objective:       Physical Exam:  Nursing note and vitals reviewed.    Constitutional: She appears well-nourished. She is not diaphoretic. No distress.     Eyes: Pupils are equal, round, and reactive to light. EOM are normal.     Cardiovascular: Normal rate and regular rhythm.     Psych/Behavior: She is alert. She is oriented to person, place, and " time. She has a normal mood and affect.     Musculoskeletal:        Back: Range of motion is limited. There is tenderness. Muscle strength is 5/5.       Right Lower Extremities: Range of motion is full. There is no tenderness. Muscle strength is 5/5. Tone is normal.        Left Lower Extremities: There is no tenderness. Muscle strength is 5/5. Tone is normal.     Neurological:        Sensory: There is no sensory deficit in the trunk. There is no sensory deficit in the extremities.        Cranial nerves: Cranial nerve(s) II, III, IV, V, VI, VII, VIII, IX, X, XI and XII are intact.     General    Nursing note and vitals reviewed.  Constitutional: She is oriented to person, place, and time. She appears well-nourished. No distress.   Eyes: EOM are normal. Pupils are equal, round, and reactive to light.   Cardiovascular:  Normal rate and regular rhythm.            Neurological: She is alert and oriented to person, place, and time.   Psychiatric: She has a normal mood and affect.             Ortho Exam        No results found for this or any previous visit.     Results for orders placed during the hospital encounter of 01/23/20    X-Ray Lumbar Complete With Flex And Ext    Narrative  EXAMINATION:  XR LUMBAR SPINE 5 VIEW WITH FLEX AND EXT    CLINICAL HISTORY:  Low back pain, >6wks conservative tx, persistent-progressive sx, surgical candidate;  Sacroiliitis, not elsewhere classified    TECHNIQUE:  Five views of the lumbar spine plus flexion extension views were performed.    COMPARISON:  None.    FINDINGS:  Anterior and posterior spinal fusion hardware is seen spanning L5-S1 levels with disc insert.  No hardware complication is identified.  No instability is demonstrated on flexion/extension maneuvers.  Mild multilevel degenerative changes with spurring of the endplates and mild facet arthropathy.  Vertebral body heights and disc spaces are preserved.  SI joints are intact.    Impression  As above      Electronically  signed by: Rell Brooks MD  Date:    01/23/2020  Time:    15:22    Results for orders placed during the hospital encounter of 01/02/25    X-Ray Lumbar Spine AP And Lateral    Narrative  EXAM: XR LUMBAR SPINE AP AND LATERAL    CLINICAL HISTORY:  Arthrodesis status.    TECHNIQUE: Lumbar spine x-ray 3 views.    COMPARISON STUDY: 01/23/2020.    FINDINGS:  There is no compression fracture.  There is grade 1 spondylolisthesis with 3 mm anterolisthesis L3 on L4, new compared prior study.  The alignment is otherwise well-preserved.  Again, status post L4-5 anterior and posterior spinal fusion with well-positioned hardware without evidence of loosening or failure.  There is a L3-4 degenerative disease with disc space narrowing and mild spurring, mildly progressive disc space narrowing compared to prior study.  The remaining disc spaces are well-preserved.  There is multilevel facet arthropathy including L2-3, L3-4, and L5-S1.    Impression  See discussion above.    Finalized on: 1/2/2025 10:18 AM By:  Eusebio Pandey MD  [facility]# [id number]      2025-01-02 10:20:12.829    [facility]   .  Assessment:     1. Back pain with radiculopathy    2. Lumbar radiculopathy    3. Scoliosis due to degenerative disease of spine in adult patient    4. Musculoligamentous strain    5. Lumbar stenosis with neurogenic claudication      Plan:     Back pain with radiculopathy  -     traMADoL (ULTRAM) 50 mg tablet; Take 1 tablet (50 mg total) by mouth every 8 (eight) hours as needed for Pain.  Dispense: 60 tablet; Refill: 0    Lumbar radiculopathy    Scoliosis due to degenerative disease of spine in adult patient    Musculoligamentous strain    Lumbar stenosis with neurogenic claudication      Patient with multiple orthopedic issues that is being currently managed by ortho and neuro spine   History of anterior and posterior lumbar fusion L4-5  as well as bilateral hip replacement and previous knee surgery  she needs a knee replacement however shows currently  holding on this pending financial concern  Has severe stenosis at L3-4 as well as L2-3 where there is adjacent segment disease causing severe stenosis as well as bilateral foraminal narrowing  She has positive sagittal balance and loss of lordosis to the lumbar spine  Discussed that any possible spinal procedure would involve multilevel lumbar decompression with interbody fusion likely T10 pelvis versus L2 to pelvis based on repeat standing x-rays  This is would involve postoperative rehabilitation and recovery ongoing for up to a year .   She would need DEXA scan to measure bone density as well as to repeat standing AP and lateral x-rays  She is unsure that she was doing her Standing x-rays full upright    We will provide them with information regarding the surgery and if   she wishes to consider surgery    I discussed if symptoms worsen or has bowel or bladder issues please contact us sooner       Thank you for the referral   Please call with any questions    Josue Ahmadi MD  Neurosurgery     Disclaimer: This note was prepared using a voice recognition system and is likely to have sound alike errors within the text.